# Patient Record
Sex: FEMALE | Race: OTHER | Employment: FULL TIME | URBAN - METROPOLITAN AREA
[De-identification: names, ages, dates, MRNs, and addresses within clinical notes are randomized per-mention and may not be internally consistent; named-entity substitution may affect disease eponyms.]

---

## 2018-11-08 ENCOUNTER — APPOINTMENT (OUTPATIENT)
Dept: CT IMAGING | Age: 53
DRG: 690 | End: 2018-11-08
Attending: EMERGENCY MEDICINE
Payer: SELF-PAY

## 2018-11-08 ENCOUNTER — HOSPITAL ENCOUNTER (INPATIENT)
Age: 53
LOS: 5 days | Discharge: HOME | DRG: 690 | End: 2018-11-13
Attending: EMERGENCY MEDICINE | Admitting: INTERNAL MEDICINE
Payer: SELF-PAY

## 2018-11-08 DIAGNOSIS — N10 ACUTE PYELONEPHRITIS: Primary | ICD-10-CM

## 2018-11-08 PROBLEM — R19.7 DIARRHEA: Status: ACTIVE | Noted: 2018-11-08

## 2018-11-08 PROBLEM — R13.10 ODYNOPHAGIA: Status: ACTIVE | Noted: 2018-11-08

## 2018-11-08 PROBLEM — E87.1 HYPONATREMIA WITH EXTRACELLULAR FLUID DEPLETION: Status: ACTIVE | Noted: 2018-11-08

## 2018-11-08 LAB
ALBUMIN SERPL-MCNC: 2.3 G/DL (ref 3.5–5)
ALBUMIN/GLOB SERPL: 0.4 {RATIO} (ref 1.1–2.2)
ALP SERPL-CCNC: 172 U/L (ref 45–117)
ALT SERPL-CCNC: 16 U/L (ref 12–78)
ANION GAP SERPL CALC-SCNC: 5 MMOL/L (ref 5–15)
APPEARANCE UR: CLEAR
AST SERPL-CCNC: 13 U/L (ref 15–37)
BACTERIA URNS QL MICRO: NEGATIVE /HPF
BASOPHILS # BLD: 0 K/UL (ref 0–0.1)
BASOPHILS NFR BLD: 0 % (ref 0–1)
BILIRUB SERPL-MCNC: 0.5 MG/DL (ref 0.2–1)
BILIRUB UR QL: NEGATIVE
BUN SERPL-MCNC: 9 MG/DL (ref 6–20)
BUN/CREAT SERPL: 13 (ref 12–20)
CALCIUM SERPL-MCNC: 7.9 MG/DL (ref 8.5–10.1)
CHLORIDE SERPL-SCNC: 96 MMOL/L (ref 97–108)
CO2 SERPL-SCNC: 31 MMOL/L (ref 21–32)
COLOR UR: ABNORMAL
CREAT SERPL-MCNC: 0.71 MG/DL (ref 0.55–1.02)
DEPRECATED S PYO AG THROAT QL EIA: NEGATIVE
DIFFERENTIAL METHOD BLD: ABNORMAL
EOSINOPHIL # BLD: 0 K/UL (ref 0–0.4)
EOSINOPHIL NFR BLD: 0 % (ref 0–7)
EPITH CASTS URNS QL MICRO: ABNORMAL /LPF
ERYTHROCYTE [DISTWIDTH] IN BLOOD BY AUTOMATED COUNT: 12.1 % (ref 11.5–14.5)
EST. AVERAGE GLUCOSE BLD GHB EST-MCNC: 372 MG/DL
GLOBULIN SER CALC-MCNC: 5.6 G/DL (ref 2–4)
GLUCOSE BLD STRIP.AUTO-MCNC: 287 MG/DL (ref 65–100)
GLUCOSE SERPL-MCNC: 352 MG/DL (ref 65–100)
GLUCOSE UR STRIP.AUTO-MCNC: >1000 MG/DL
HBA1C MFR BLD: 14.6 % (ref 4.2–6.3)
HCT VFR BLD AUTO: 38 % (ref 35–47)
HGB BLD-MCNC: 12.1 G/DL (ref 11.5–16)
HGB UR QL STRIP: ABNORMAL
IMM GRANULOCYTES # BLD: 0.2 K/UL (ref 0–0.04)
IMM GRANULOCYTES NFR BLD AUTO: 1 % (ref 0–0.5)
KETONES UR QL STRIP.AUTO: 40 MG/DL
LACTATE BLD-SCNC: 0.9 MMOL/L (ref 0.4–2)
LEUKOCYTE ESTERASE UR QL STRIP.AUTO: NEGATIVE
LIPASE SERPL-CCNC: 223 U/L (ref 73–393)
LYMPHOCYTES # BLD: 0.8 K/UL (ref 0.8–3.5)
LYMPHOCYTES NFR BLD: 5 % (ref 12–49)
MCH RBC QN AUTO: 27.2 PG (ref 26–34)
MCHC RBC AUTO-ENTMCNC: 31.8 G/DL (ref 30–36.5)
MCV RBC AUTO: 85.4 FL (ref 80–99)
MONOCYTES # BLD: 0.6 K/UL (ref 0–1)
MONOCYTES NFR BLD: 4 % (ref 5–13)
NEUTS SEG # BLD: 13.6 K/UL (ref 1.8–8)
NEUTS SEG NFR BLD: 90 % (ref 32–75)
NITRITE UR QL STRIP.AUTO: NEGATIVE
NRBC # BLD: 0 K/UL (ref 0–0.01)
NRBC BLD-RTO: 0 PER 100 WBC
PH UR STRIP: 6.5 [PH] (ref 5–8)
PLATELET # BLD AUTO: 442 K/UL (ref 150–400)
PMV BLD AUTO: 10.6 FL (ref 8.9–12.9)
POTASSIUM SERPL-SCNC: 3.7 MMOL/L (ref 3.5–5.1)
PROT SERPL-MCNC: 7.9 G/DL (ref 6.4–8.2)
PROT UR STRIP-MCNC: NEGATIVE MG/DL
RBC # BLD AUTO: 4.45 M/UL (ref 3.8–5.2)
RBC #/AREA URNS HPF: ABNORMAL /HPF (ref 0–5)
RBC MORPH BLD: ABNORMAL
SERVICE CMNT-IMP: ABNORMAL
SODIUM SERPL-SCNC: 132 MMOL/L (ref 136–145)
SP GR UR REFRACTOMETRY: 1.03 (ref 1–1.03)
TROPONIN I SERPL-MCNC: <0.05 NG/ML
UR CULT HOLD, URHOLD: NORMAL
UROBILINOGEN UR QL STRIP.AUTO: 1 EU/DL (ref 0.2–1)
WBC # BLD AUTO: 15.2 K/UL (ref 3.6–11)
WBC URNS QL MICRO: ABNORMAL /HPF (ref 0–4)

## 2018-11-08 PROCEDURE — 83605 ASSAY OF LACTIC ACID: CPT

## 2018-11-08 PROCEDURE — 74177 CT ABD & PELVIS W/CONTRAST: CPT

## 2018-11-08 PROCEDURE — 74011250636 HC RX REV CODE- 250/636: Performed by: INTERNAL MEDICINE

## 2018-11-08 PROCEDURE — 87147 CULTURE TYPE IMMUNOLOGIC: CPT

## 2018-11-08 PROCEDURE — 74011636320 HC RX REV CODE- 636/320: Performed by: RADIOLOGY

## 2018-11-08 PROCEDURE — 83036 HEMOGLOBIN GLYCOSYLATED A1C: CPT

## 2018-11-08 PROCEDURE — 99284 EMERGENCY DEPT VISIT MOD MDM: CPT

## 2018-11-08 PROCEDURE — 74011000258 HC RX REV CODE- 258: Performed by: INTERNAL MEDICINE

## 2018-11-08 PROCEDURE — 96375 TX/PRO/DX INJ NEW DRUG ADDON: CPT

## 2018-11-08 PROCEDURE — 87040 BLOOD CULTURE FOR BACTERIA: CPT

## 2018-11-08 PROCEDURE — 74011000250 HC RX REV CODE- 250: Performed by: PHYSICIAN ASSISTANT

## 2018-11-08 PROCEDURE — 80053 COMPREHEN METABOLIC PANEL: CPT

## 2018-11-08 PROCEDURE — 82962 GLUCOSE BLOOD TEST: CPT

## 2018-11-08 PROCEDURE — 65270000029 HC RM PRIVATE

## 2018-11-08 PROCEDURE — 74011250636 HC RX REV CODE- 250/636: Performed by: EMERGENCY MEDICINE

## 2018-11-08 PROCEDURE — 74011636637 HC RX REV CODE- 636/637: Performed by: INTERNAL MEDICINE

## 2018-11-08 PROCEDURE — 87077 CULTURE AEROBIC IDENTIFY: CPT

## 2018-11-08 PROCEDURE — 81001 URINALYSIS AUTO W/SCOPE: CPT

## 2018-11-08 PROCEDURE — 85025 COMPLETE CBC W/AUTO DIFF WBC: CPT

## 2018-11-08 PROCEDURE — 93005 ELECTROCARDIOGRAM TRACING: CPT

## 2018-11-08 PROCEDURE — 36415 COLL VENOUS BLD VENIPUNCTURE: CPT

## 2018-11-08 PROCEDURE — 84484 ASSAY OF TROPONIN QUANT: CPT

## 2018-11-08 PROCEDURE — 74011250637 HC RX REV CODE- 250/637: Performed by: INTERNAL MEDICINE

## 2018-11-08 PROCEDURE — 87086 URINE CULTURE/COLONY COUNT: CPT

## 2018-11-08 PROCEDURE — 83690 ASSAY OF LIPASE: CPT

## 2018-11-08 PROCEDURE — 87880 STREP A ASSAY W/OPTIC: CPT

## 2018-11-08 PROCEDURE — 96361 HYDRATE IV INFUSION ADD-ON: CPT

## 2018-11-08 PROCEDURE — 96374 THER/PROPH/DIAG INJ IV PUSH: CPT

## 2018-11-08 PROCEDURE — 74011250637 HC RX REV CODE- 250/637: Performed by: PHYSICIAN ASSISTANT

## 2018-11-08 PROCEDURE — 74011250636 HC RX REV CODE- 250/636: Performed by: PHYSICIAN ASSISTANT

## 2018-11-08 PROCEDURE — 87070 CULTURE OTHR SPECIMN AEROBIC: CPT

## 2018-11-08 PROCEDURE — 87186 SC STD MICRODIL/AGAR DIL: CPT

## 2018-11-08 RX ORDER — MAGNESIUM SULFATE 100 %
4 CRYSTALS MISCELLANEOUS AS NEEDED
Status: DISCONTINUED | OUTPATIENT
Start: 2018-11-08 | End: 2018-11-13 | Stop reason: HOSPADM

## 2018-11-08 RX ORDER — SODIUM CHLORIDE 9 MG/ML
100 INJECTION, SOLUTION INTRAVENOUS CONTINUOUS
Status: DISPENSED | OUTPATIENT
Start: 2018-11-08 | End: 2018-11-11

## 2018-11-08 RX ORDER — ONDANSETRON 2 MG/ML
4 INJECTION INTRAMUSCULAR; INTRAVENOUS
Status: COMPLETED | OUTPATIENT
Start: 2018-11-08 | End: 2018-11-08

## 2018-11-08 RX ORDER — NYSTATIN 100000 [USP'U]/ML
500000 SUSPENSION ORAL 4 TIMES DAILY
Status: DISCONTINUED | OUTPATIENT
Start: 2018-11-08 | End: 2018-11-09

## 2018-11-08 RX ORDER — KETOROLAC TROMETHAMINE 30 MG/ML
15 INJECTION, SOLUTION INTRAMUSCULAR; INTRAVENOUS
Status: COMPLETED | OUTPATIENT
Start: 2018-11-08 | End: 2018-11-08

## 2018-11-08 RX ORDER — SODIUM CHLORIDE 0.9 % (FLUSH) 0.9 %
5-10 SYRINGE (ML) INJECTION AS NEEDED
Status: DISCONTINUED | OUTPATIENT
Start: 2018-11-08 | End: 2018-11-13 | Stop reason: HOSPADM

## 2018-11-08 RX ORDER — ONDANSETRON 2 MG/ML
4 INJECTION INTRAMUSCULAR; INTRAVENOUS
Status: DISCONTINUED | OUTPATIENT
Start: 2018-11-08 | End: 2018-11-13 | Stop reason: HOSPADM

## 2018-11-08 RX ORDER — ENOXAPARIN SODIUM 100 MG/ML
40 INJECTION SUBCUTANEOUS EVERY 24 HOURS
Status: DISCONTINUED | OUTPATIENT
Start: 2018-11-08 | End: 2018-11-13 | Stop reason: HOSPADM

## 2018-11-08 RX ORDER — SODIUM CHLORIDE 0.9 % (FLUSH) 0.9 %
5-10 SYRINGE (ML) INJECTION EVERY 8 HOURS
Status: DISCONTINUED | OUTPATIENT
Start: 2018-11-08 | End: 2018-11-13 | Stop reason: HOSPADM

## 2018-11-08 RX ORDER — DEXTROSE 50 % IN WATER (D50W) INTRAVENOUS SYRINGE
12.5-25 AS NEEDED
Status: DISCONTINUED | OUTPATIENT
Start: 2018-11-08 | End: 2018-11-13 | Stop reason: HOSPADM

## 2018-11-08 RX ORDER — INSULIN LISPRO 100 [IU]/ML
INJECTION, SOLUTION INTRAVENOUS; SUBCUTANEOUS
Status: DISCONTINUED | OUTPATIENT
Start: 2018-11-08 | End: 2018-11-13 | Stop reason: HOSPADM

## 2018-11-08 RX ORDER — SODIUM CHLORIDE 9 MG/ML
125 INJECTION, SOLUTION INTRAVENOUS CONTINUOUS
Status: DISCONTINUED | OUTPATIENT
Start: 2018-11-08 | End: 2018-11-09

## 2018-11-08 RX ORDER — NALOXONE HYDROCHLORIDE 0.4 MG/ML
0.4 INJECTION, SOLUTION INTRAMUSCULAR; INTRAVENOUS; SUBCUTANEOUS AS NEEDED
Status: DISCONTINUED | OUTPATIENT
Start: 2018-11-08 | End: 2018-11-13 | Stop reason: HOSPADM

## 2018-11-08 RX ORDER — ACETAMINOPHEN 325 MG/1
650 TABLET ORAL
Status: DISCONTINUED | OUTPATIENT
Start: 2018-11-08 | End: 2018-11-13 | Stop reason: HOSPADM

## 2018-11-08 RX ADMIN — NYSTATIN 500000 UNITS: 500000 SUSPENSION ORAL at 22:03

## 2018-11-08 RX ADMIN — IOPAMIDOL 100 ML: 755 INJECTION, SOLUTION INTRAVENOUS at 17:28

## 2018-11-08 RX ADMIN — INSULIN LISPRO 3 UNITS: 100 INJECTION, SOLUTION INTRAVENOUS; SUBCUTANEOUS at 22:03

## 2018-11-08 RX ADMIN — Medication 10 ML: at 22:04

## 2018-11-08 RX ADMIN — ONDANSETRON 4 MG: 2 INJECTION INTRAMUSCULAR; INTRAVENOUS at 16:02

## 2018-11-08 RX ADMIN — SODIUM CHLORIDE 1000 ML: 900 INJECTION, SOLUTION INTRAVENOUS at 16:01

## 2018-11-08 RX ADMIN — SODIUM CHLORIDE 100 ML/HR: 900 INJECTION, SOLUTION INTRAVENOUS at 22:00

## 2018-11-08 RX ADMIN — CEFTRIAXONE SODIUM 1 G: 1 INJECTION, POWDER, FOR SOLUTION INTRAMUSCULAR; INTRAVENOUS at 19:04

## 2018-11-08 RX ADMIN — ENOXAPARIN SODIUM 40 MG: 40 INJECTION SUBCUTANEOUS at 22:03

## 2018-11-08 RX ADMIN — KETOROLAC TROMETHAMINE 15 MG: 30 INJECTION, SOLUTION INTRAMUSCULAR at 16:02

## 2018-11-08 RX ADMIN — LIDOCAINE HYDROCHLORIDE 40 ML: 20 SOLUTION ORAL; TOPICAL at 16:25

## 2018-11-08 RX ADMIN — PIPERACILLIN SODIUM,TAZOBACTAM SODIUM 3.38 G: 3; .375 INJECTION, POWDER, FOR SOLUTION INTRAVENOUS at 22:01

## 2018-11-08 NOTE — ED TRIAGE NOTES
TRANSFER - OUT REPORT: 
 
Verbal report given to David Moctezuma (name) on Gato Perez  being transferred to [facility] (unit) for routine progression of care Report consisted of patients Situation, Background, Assessment and  
Recommendations(SBAR). Information from the following report(s) SBAR, ED Summary, [facility] and Recent Results was reviewed with the receiving nurse. Lines:  
Peripheral IV 11/08/18 Right Antecubital (Active) Site Assessment Clean, dry, & intact 11/8/2018  4:11 PM  
Phlebitis Assessment 0 11/8/2018  4:11 PM  
Infiltration Assessment 0 11/8/2018  4:11 PM  
Dressing Status Clean, dry, & intact 11/8/2018  4:11 PM  
Dressing Type Transparent;Tape 11/8/2018  4:11 PM  
Hub Color/Line Status Pink;Flushed 11/8/2018  4:11 PM  
Action Taken Blood drawn 11/8/2018  4:11 PM  
Alcohol Cap Used Yes 11/8/2018  4:11 PM  
  
 
Opportunity for questions and clarification was provided. Patient transported with: 
 Smit Ovens

## 2018-11-08 NOTE — ED PROVIDER NOTES
3:16 PM 
I have evaluated the patient as the Provider in Triage. I have reviewed Her vital signs and the triage nurse assessment. I have talked with the patient and any available family and advised that I am the provider in triage and have ordered the appropriate study to initiate their work up based on the clinical presentation during my assessment. I have advised that the patient will be accommodated in the Main ED as soon as possible. I have also requested to contact the triage nurse or myself immediately if the patient experiences any changes in their condition during this brief waiting period. 47 yo female presents with lower abdominal pain and urinary symptoms. + fever. + sore throat. +nausea. 9/10 pain. Karen Gar. Bryanna Langston MD 
 
48 y.o. female with no significant past medical history who presents from home with chief complaint of abdominal pain. Pt has been experiencing a burning sensation radiating from her throat down to her epigastrium intermittently for the past 2 weeks that is exacerbated by PO. She also states that she has been experiencing abdominal pain and diarrhea over the past 4 days with 3-4 episodes/day. Pt notes a subjective fever and some dysuria as well. Pt's abdominal pain is currently the worst in the RLQ. She states that her symptoms today are consistent with how her symptoms have been over the past 2 weeks and she denies any worsening. Pt has a h/o diabetes but does not take any daily medications. She has a h/o  but denies any other abdominal surgeries. Pt denies any vomiting or SOB. There are no other acute medical concerns at this time. Social hx: no tobacco use; no EtOH use ; son is translating at patient's request 
 
Note written by Ron Covington, as dictated by Sharmila Bolton PA-C 4:10 PM 
 
 
The history is provided by the patient and a relative (son). A  was used (son). No past medical history on file. No past surgical history on file. Family History:  
Problem Relation Age of Onset  Diabetes Sister Social History Socioeconomic History  Marital status: SINGLE Spouse name: Not on file  Number of children: Not on file  Years of education: Not on file  Highest education level: Not on file Social Needs  Financial resource strain: Not on file  Food insecurity - worry: Not on file  Food insecurity - inability: Not on file  Transportation needs - medical: Not on file  Transportation needs - non-medical: Not on file Occupational History  Not on file Tobacco Use  Smoking status: Never Smoker Substance and Sexual Activity  Alcohol use: No  
  Alcohol/week: 0.0 oz  Drug use: No  
 Sexual activity: Not on file Other Topics Concern  Not on file Social History Narrative  Not on file ALLERGIES: Patient has no known allergies. Review of Systems Constitutional: Positive for fever (subjective). HENT: Positive for sore throat. Respiratory: Negative for shortness of breath. Cardiovascular: Positive for chest pain. Gastrointestinal: Positive for abdominal pain and diarrhea. Negative for vomiting. Genitourinary: Positive for dysuria. Musculoskeletal: Negative for myalgias. All other systems reviewed and are negative. Vitals:  
 11/08/18 1517 BP: 112/64 Pulse: 92 Resp: 18 Temp: 98.5 °F (36.9 °C) SpO2: 100% Weight: 57.2 kg (126 lb) Physical Exam  
Constitutional: She is oriented to person, place, and time. She appears well-developed and well-nourished. She is active. Non-toxic appearance. No distress.  female HENT:  
Head: Normocephalic and atraumatic. Eyes: Conjunctivae are normal. Pupils are equal, round, and reactive to light. Right eye exhibits no discharge. Left eye exhibits no discharge.   
Neck: Normal range of motion and full passive range of motion without pain. No tracheal tenderness present. Cardiovascular: Normal rate, regular rhythm, normal heart sounds, intact distal pulses and normal pulses. Exam reveals no gallop and no friction rub. No murmur heard. Pulmonary/Chest: Effort normal and breath sounds normal. No respiratory distress. She has no wheezes. She has no rales. She exhibits no tenderness. Abdominal: Soft. Bowel sounds are normal. She exhibits no distension. There is tenderness (states most tenderness is in RLQ, however has epigastric and RUQ TTP) in the right upper quadrant, right lower quadrant and epigastric area. There is no rebound and no guarding. Musculoskeletal: Normal range of motion. She exhibits no edema or tenderness. Neurological: She is alert and oriented to person, place, and time. She has normal strength. No cranial nerve deficit or sensory deficit. Coordination normal.  
Skin: Skin is warm, dry and intact. Capillary refill takes less than 2 seconds. No abrasion and no rash noted. She is not diaphoretic. No erythema. Psychiatric: She has a normal mood and affect. Her speech is normal and behavior is normal. Cognition and memory are normal.  
Nursing note and vitals reviewed. MDM Number of Diagnoses or Management Options Diagnosis management comments:  
Ddx: UTI, pyelo, electrolyte abnormality, ACS / atypical CP, cholecystitis Amount and/or Complexity of Data Reviewed Clinical lab tests: ordered and reviewed Tests in the radiology section of CPT®: reviewed and ordered Review and summarize past medical records: yes Discuss the patient with other providers: yes Patient Progress Patient progress: stable Procedures The patient was seen by the supervising physician who was the provider in triage. I discussed patient's PMH, exam findings as well as careplan with the attending who agrees with care plan. Katlyn Melendez PA-C 
 
 
EKG interpretation: Rhythm: normal sinus rhythm; and regular . Rate (approx.): 88; Axis: normal; P wave: normal; QRS interval: normal ; ST/T wave: normal; Other findings: normal. 
Interpreted by Dr. Shelby Thomas 6:10pm 
Patient with acute pyelo on CT, leukocytosis, possible early abscesses. Discussed with Dr. Shelby Thomas who recommends admission, will add poc lactate (vitals have been normal here, only with leukocytosis initially), he recommends [place] and hospitalist admission. Fernando Montgomery PA-C 
 
 
CONSULT NOTE:  
6:12 PM 
Fernando Montgomery PA-C spoke with Dr. Du Mueller, Specialty: hospitalist 
Discussed pt's hx, disposition, and available diagnostic and imaging results. Reviewed care plans. Consultant agrees with plans as outlined. He will review the chart. Written by Fernando Montgomery PA-C.

## 2018-11-09 LAB
ATRIAL RATE: 88 BPM
CALCULATED P AXIS, ECG09: 35 DEGREES
CALCULATED R AXIS, ECG10: 43 DEGREES
CALCULATED T AXIS, ECG11: 33 DEGREES
DIAGNOSIS, 93000: NORMAL
GLUCOSE BLD STRIP.AUTO-MCNC: 264 MG/DL (ref 65–100)
GLUCOSE BLD STRIP.AUTO-MCNC: 269 MG/DL (ref 65–100)
GLUCOSE BLD STRIP.AUTO-MCNC: 272 MG/DL (ref 65–100)
GLUCOSE BLD STRIP.AUTO-MCNC: 279 MG/DL (ref 65–100)
GLUCOSE BLD STRIP.AUTO-MCNC: 291 MG/DL (ref 65–100)
P-R INTERVAL, ECG05: 154 MS
Q-T INTERVAL, ECG07: 372 MS
QRS DURATION, ECG06: 82 MS
QTC CALCULATION (BEZET), ECG08: 450 MS
SERVICE CMNT-IMP: ABNORMAL
VENTRICULAR RATE, ECG03: 88 BPM

## 2018-11-09 PROCEDURE — 74011250637 HC RX REV CODE- 250/637: Performed by: INTERNAL MEDICINE

## 2018-11-09 PROCEDURE — 82962 GLUCOSE BLOOD TEST: CPT

## 2018-11-09 PROCEDURE — 74011636637 HC RX REV CODE- 636/637: Performed by: INTERNAL MEDICINE

## 2018-11-09 PROCEDURE — 74011250636 HC RX REV CODE- 250/636: Performed by: INTERNAL MEDICINE

## 2018-11-09 PROCEDURE — 90686 IIV4 VACC NO PRSV 0.5 ML IM: CPT | Performed by: INTERNAL MEDICINE

## 2018-11-09 PROCEDURE — 65270000029 HC RM PRIVATE

## 2018-11-09 PROCEDURE — 90471 IMMUNIZATION ADMIN: CPT

## 2018-11-09 PROCEDURE — 74011000258 HC RX REV CODE- 258: Performed by: INTERNAL MEDICINE

## 2018-11-09 PROCEDURE — 94760 N-INVAS EAR/PLS OXIMETRY 1: CPT

## 2018-11-09 RX ORDER — FLUCONAZOLE 100 MG/1
200 TABLET ORAL DAILY
Status: COMPLETED | OUTPATIENT
Start: 2018-11-09 | End: 2018-11-12

## 2018-11-09 RX ADMIN — Medication 10 ML: at 04:08

## 2018-11-09 RX ADMIN — ENOXAPARIN SODIUM 40 MG: 40 INJECTION SUBCUTANEOUS at 20:06

## 2018-11-09 RX ADMIN — INSULIN LISPRO 5 UNITS: 100 INJECTION, SOLUTION INTRAVENOUS; SUBCUTANEOUS at 11:57

## 2018-11-09 RX ADMIN — SODIUM CHLORIDE 100 ML/HR: 900 INJECTION, SOLUTION INTRAVENOUS at 20:50

## 2018-11-09 RX ADMIN — PIPERACILLIN SODIUM,TAZOBACTAM SODIUM 3.38 G: 3; .375 INJECTION, POWDER, FOR SOLUTION INTRAVENOUS at 04:07

## 2018-11-09 RX ADMIN — INSULIN LISPRO 3 UNITS: 100 INJECTION, SOLUTION INTRAVENOUS; SUBCUTANEOUS at 22:17

## 2018-11-09 RX ADMIN — BENZOCAINE AND MENTHOL 1 LOZENGE: 15; 3.6 LOZENGE ORAL at 11:55

## 2018-11-09 RX ADMIN — INSULIN LISPRO 5 UNITS: 100 INJECTION, SOLUTION INTRAVENOUS; SUBCUTANEOUS at 16:44

## 2018-11-09 RX ADMIN — FLUCONAZOLE 200 MG: 100 TABLET ORAL at 11:55

## 2018-11-09 RX ADMIN — Medication 10 ML: at 22:18

## 2018-11-09 RX ADMIN — PIPERACILLIN SODIUM,TAZOBACTAM SODIUM 3.38 G: 3; .375 INJECTION, POWDER, FOR SOLUTION INTRAVENOUS at 12:01

## 2018-11-09 RX ADMIN — ACETAMINOPHEN 650 MG: 325 TABLET, FILM COATED ORAL at 08:02

## 2018-11-09 RX ADMIN — BENZOCAINE AND MENTHOL 1 LOZENGE: 15; 3.6 LOZENGE ORAL at 03:57

## 2018-11-09 RX ADMIN — INFLUENZA VIRUS VACCINE 0.5 ML: 15; 15; 15; 15 SUSPENSION INTRAMUSCULAR at 08:18

## 2018-11-09 RX ADMIN — BENZOCAINE AND MENTHOL 1 LOZENGE: 15; 3.6 LOZENGE ORAL at 08:49

## 2018-11-09 RX ADMIN — PIPERACILLIN SODIUM,TAZOBACTAM SODIUM 3.38 G: 3; .375 INJECTION, POWDER, FOR SOLUTION INTRAVENOUS at 20:06

## 2018-11-09 RX ADMIN — NYSTATIN 500000 UNITS: 500000 SUSPENSION ORAL at 09:33

## 2018-11-09 RX ADMIN — ACETAMINOPHEN 650 MG: 325 TABLET, FILM COATED ORAL at 03:58

## 2018-11-09 RX ADMIN — INSULIN LISPRO 5 UNITS: 100 INJECTION, SOLUTION INTRAVENOUS; SUBCUTANEOUS at 08:02

## 2018-11-09 NOTE — PROGRESS NOTES
Pt complaining of 10/10 pain in her throat. MD notified and order for Cepacol PRN placed. Pt viably wincing when trying to swallow water.

## 2018-11-09 NOTE — CDMP QUERY
There is documentation of Diabetes type 2 for this patient; can this be further clarified to reflect Type 2 diabetes mellitus with hyperglycemia in the setting of POC glucose 287/279/269, Hgb A1c 14.6 Est. average glucose 372 requiring glucose monitoring and correction scale insulin coverage. 
 
=> Other explanation of clinical findings  
=> Clinically Undetermined (no explanation for clinical findings) The medical record reflects the following clinical findings, treatment, and risk factors. Risk Factors:  Diabetes type 2 Clinical Indicators:   
Hgb A1c 14.6 Est. average glucose 372 POC glucose 287/279/269 Treatment: POC glucose monitoring, Correction scale insulin Please clarify and document your clinical opinion in the progress notes and discharge summary including the definitive and/or presumptive diagnosis, (suspected or probable), related to the above clinical findings. Please include clinical findings supporting your diagnosis. Thank you Erinn Crum 
[facility] 
[phone]

## 2018-11-09 NOTE — PROGRESS NOTES
TRANSFER - IN REPORT: 
 
Verbal report received from Rosalinda(name) on [address]  being received from ED(unit) for routine progression of care Report consisted of patients Situation, Background, Assessment and  
Recommendations(SBAR). Information from the following report(s) SBAR, Kardex, ED Summary, Intake/Output, MAR, Accordion and Recent Results was reviewed with the receiving nurse. Opportunity for questions and clarification was provided. Assessment completed upon patients arrival to unit and care assumed. Primary Nurse Micki Dsouza and MIGUEL ANGEL Guerrero performed a dual skin assessment on this patient No impairment noted Jung score is 23

## 2018-11-09 NOTE — H&P
[address] [place] [address] 
[phone] Admission History and Physical 
 
 
NAME:              Regulo Vance :   1965 MRN:  [id number] PCP:  None Date:     2018 Chief  Complaint: Abdominal pain, dysphagia, diarrhea History Of Presenting Illness: Ms. Ginna Maria is a 48 y.o. female who is being admitted for acute pyelonephritis. Ms. Ginna Maria only speaks Libyan and I have discussed with her via a [employer]. She presented to our Emergency Department today complaining of a worsening generalized but mostly right flank abdominal pain, associated with diarrhea. She has also seen having difficulty swallowing with subjective fever and chills. No cough or SOB. No nausea or vomiting. Her diarrhea had no blood. A CT scan done in the ED showed likely acute pyelonephritis. She will be admitted for further management. No Known Allergies Prior to Admission medications Medication Sig Start Date End Date Taking? Authorizing Provider  
metFORMIN ER (GLUCOPHAGE XR) 500 mg tablet 3 with supper for 1 week and then if feels well, 4 with supper. Libyan instructions. 16   Gamaliel Sim MD  
naproxen (NAPROSYN) 375 mg tablet Take 1 Tab by mouth two (2) times daily (with meals). [place]yan instructions. Only for severe pain. 16   Gamaliel Sim MD  
amitriptyline (ELAVIL) 10 mg tablet Take 1 Tab by mouth nightly. For pain and sleep. Libyan instructions. 16   Kayleen Sands NP Past Medical History:  
Diagnosis Date  DM (diabetes mellitus) ([facility] [address] No past surgical history on file. Social History Tobacco Use  Smoking status: Never Smoker Substance Use Topics  Alcohol use: No  
  Alcohol/week: 0.0 oz Family History Problem Relation Age of Onset  Diabetes Sister Review of Systems: 
Constitutional ROS: + fever, + chills, + rigors but no night sweats Respiratory ROS: no cough, sputum, hemoptysis, dyspnea or pleuritic pain. Cardiovascular ROS: no chest pain, palpitations, orthopnea, PND or syncope Endocrine ROS: no polydispsia, polyuria, heat or cold intolerance or major weight change. Gastrointestinal ROS: + dysphagia, abdominal pain but no nausea, vomiting Genito-Urinary ROS: no dysuria, frequency, hematuria, retention or flank pain Musculoskeletal ROS: no joint pain, swelling or muscular tenderness Neurological ROS: no headache, confusion, focal weakness or any other neurological symptoms Psychiatric ROS: no depression, anxiety, mood swings Dermatological ROS: no rash, pruritis, or urticaria Heme-Lymph ROS: no swollen glands, bleeding Examination: 
 
Constitutional:   
Visit Vitals BP 99/67 Pulse 92 Temp 98.5 °F (36.9 °C) Resp 18 Wt 57.2 kg (126 lb) SpO2 96% BMI 25.61 kg/m² General:  Weak and ill looking patient in no acute distress Eyes: Pink conjunctivae, PERRLA with no discharge. Normal eye movements Ear, Nose, Mouth & Throat: No ottorrhea, rhinorrhea, non tender sinuses, dry mucous membranes. Likely oral thrush Respiratory:  No accessory muscle use, clear breath sounds without crackles or wheezes Cardiovascular:  No JVD or murmurs, regular and normal S1, S2 without thrills, bruits or peripheral edema. Capillary refil+, good distal pulses GI & :  Soft abdomen, mild abdominal tenderness but not distended and no palpable organomegaly Heme:  No cervical or axillary adenopathy. Musculoskeletal:  No cyanosis, clubbing, atrophy or deformities Skin:  No rashes, bruising or ulcers Neurological: Awake and alert, speech is clear, CNs 2-12 are grossly intact and otherwise non focal 
Psychiatric:  Has a fair insight and is oriented x 3 
________________________________________________________________________ Data Review: 
 
Labs: Recent Labs 11/08/18 
1534 WBC 15.2* HGB 12.1 HCT 38.0  
* Recent Labs 11/08/18 
1534 * K 3.7 CL 96* CO2 31 * BUN 9  
CREA 0.71  
CA 7.9* ALB 2.3*  
SGOT 13* ALT 16 No components found for: [place] No results for input(s): PH, PCO2, PO2, HCO3, FIO2 in the last 72 hours. No results for input(s): INR in the last 72 hours. No lab exists for component: INREXT Radiological Studies:   
 
CT scan abdomen - Extensive parenchymal abnormality throughout the right kidney most suggestive of severe acute pyelonephritis/focal nephritis, with possible developing parenchymal abscess in scattered areas. There are nonobstructing intrarenal calculi but no hydroureteronephrosis. Assessment & Impression: Ms. Lionel Moritz is a 48 y.o. female being evaluated for:  
 
Principal Problem: 
  Acute pyelonephritis (11/8/2018) Active Problems: 
  DM (diabetes mellitus), type 2 ([address] (1/12/2016) Diarrhea (11/8/2018) Odynophagia (11/8/2018) Hyponatremia with extracellular fluid depletion (11/8/2018) Plan of management: 
 
Acute pyelonephritis (11/8/2018): involving right kidney. Concern for an abscess formation. Admit to hospital. IVFs. Obtain urine and blood cultures. Empiric IV Zosyn. Urology consult if no improvement DM (diabetes mellitus), type 2 ([address] (1/12/2016): unclear if she is taking any medications. Monitor blood glucose. SSI per protocol for now. May need long acting insulin. Check A1c Hyponatremia with extracellular fluid depletion (11/8/2018): hydrate with IV saline and follow Na Diarrhea (11/8/2018): currently she has none. IVFs. Stool studies if persistent Odynophagia (11/8/2018): suspect thrush. Rapid strep. Nystatin and follow Code Status:  Full Surrogate decision maker: Family Risk of deterioration: high Total time spent for the care of the patient: 79 Minutes Care Plan discussed with: Patient, Nursing Staff and ED physician Discussed:  Code Status, Care Plan and D/C Planning Prophylaxis:  Lovenox Probable Disposition:  Home w/Family 
        
___________________________________________________ Attending Physician: Teodora Izquierdo MD

## 2018-11-09 NOTE — PROGRESS NOTES
11/8/2018 
7:16 PM 
Case management note Met with patient and family, they have son who did some translation. They live in 2 story home with 4 steps to enter, her bedroom is on 1st floor. Patient performs ADL's independently. [facility] on [address] for [facility] needs No PCP, gave information on [facility] for follow up  is rama Donnelly  limited Verlin Frizzle Reason for Admission:   Acute pyelonephritis RRAT Score:          6 Plan for utilizing home health:      Not for this admission Likelihood of Readmission:  Low/green Transition of Care Plan:        Home with family assistance and information provided on [facility] Management Interventions PCP Verified by CM: No(none) Mode of Transport at Discharge: Self Transition of Care Consult (CM Consult): Discharge Planning Current Support Network: Lives with Spouse Plan discussed with Pt/Family/Caregiver: Yes Discharge Location Discharge Placement: Home with family assistance Jay Carlos, Jesus Manuel N Steven Jaimes

## 2018-11-09 NOTE — DIABETES MGMT
DTC Consult Note Recommendations/ Comments: If appropriate, please consider starting pt on NPH 7 units bid. **Please have pt self-inject all future insulin doses during admission** 
 
Pt does not have insurance and discussed with pt purchasing DM supplies at [address] [place] [address] for cost savings. Pt will require the following prescriptions at discharge. The medications at a low-cost are unique to [facility] :  
 
1. Reli-On Novolin N (NPH) 2. Reli-On Insulin Syringe 1/3cc 3. Reli-On Prime Blood Glucose meter 4. Reli-On Prime Blood Glucose test strips #50 5. Reli-On Lancing device 6. ReliOn 33G Micro Thin Lancets 100ct Addendum @ 35 67 15: Met with pt and family member, who served as , to review SMBG. Pt able to return demonstration of SMBG with assistance. Encouraged pt to purchase Reli-On brand meter and supplies when supplies from meter kit provided run out, pt verbalized understanding. Pt BG at time of visit 289mg/dL. Current hospital DM medication:  
-Humalog normal sensitivity correction Consult received for:  [x]             Assessment of home management 
              []      Medication Recommendations []             Meter/monitoring 
   [x]             Insulin instruction []             New diagnosis []             Outpatient education []             Insulin pump patient []             Insulin infusion 
   []             DKA/HHS Chart reviewed and initial evaluation complete on [address] Patient is a 48 y.o. female with known history of DM on no meds at home. Used [employer]  services for consult. Pt reported that she has had DM for over 2 years and has never taken any meds or checked her BG. Reviewed A1c value with pt. Discussed insulin usage/storage and rotating injection sites. Pt reported she would feel most comfortable giving injections in her abdomen.  Pt able to return demonstration of insulin injection using vial and syringe on demo skin with minimal assistance. Reviewed hypo signs/symtpoms and treatment, pt verbalized understanding. Reviewed meal planning and importance of eliminating sugar sweetened beverages. Discussed plate method and putting meals together. Assessed and instructed patient on the following:  
·  interpretation of lab results, blood sugar goals, complications of diabetes mellitus, hypoglycemia prevention and treatment, exercise, illness management, SMBG skills, nutrition, referred to Diabetes Educator and site rotation Encouraged the following:  
· increased exercise · dietary modifications: eliminating juice, well balanced meals, use plate method to put meals together · regular blood sugar monitoring: at least 2x/day Provided patient with the following: [x]             Survival skills education materials [x]             Insulin education materials []             CHO counting education materials [x]             Outpatient DTC contact number 
             []             Glucometer Patient was able to give return demonstration of 
  [x]       Glucometer [x]       saline injection  
   with []     without [x]       assistance needed. [x]       Nurse to have patient self inject prior to discharge. Discussed with patient and/or family need for follow up appointment for diabetes management after discharge - pt declined outpatient education due to transportation issues. A1c:  
Lab Results Component Value Date/Time Hemoglobin A1c 14.6 (H) 11/08/2018 03:34 PM  
 
 
Recent Glucose Results:  
Lab Results Component Value Date/Time  (H) 11/08/2018 03:34 PM  
 GLUCPOC 291 (H) 11/09/2018 11:34 AM  
 GLUCPOC 269 (H) 11/09/2018 07:25 AM  
 GLUCPOC 279 (H) 11/09/2018 07:07 AM  
  
 
Lab Results Component Value Date/Time  Creatinine 0.71 11/08/2018 03:34 PM  
 
 CrCl cannot be calculated (Unknown ideal weight. ). Active Orders Diet DIET DIABETIC CONSISTENT CARB Regular PO intake:  
Patient Vitals for the past 72 hrs: 
 % Diet Eaten 11/09/18 0829 25 % Will continue to follow as needed. Thank you. Marylu Guerrier RD, CDE [facility] Office: [phone] Pager: [phone]

## 2018-11-09 NOTE — PROGRESS NOTES
Brandon Lugo [facility] 
[address], [place], [address] [facility] 
[phone] Medical Progress Note NAME:         Regulo Vance :        1965 MRM:        [id number] Date:          2018 Subjective: Patient has been seen and examined as a follow up for acute pyelonephritis. Chart, labs, diagnostics reviewed. She still has dysphagia. Abdominal discomfort better today. No fever or chills. I have discussed with her through the [employer] Phone  Objective: 
 
Vital Signs: 
 
Visit Vitals BP 95/61 (BP 1 Location: Right arm, BP Patient Position: At rest) Pulse 79 Temp 98 °F (36.7 °C) Resp 20 Wt 57.2 kg (126 lb) SpO2 94% Breastfeeding? No  
BMI 25.61 kg/m² Intake/Output Summary (Last 24 hours) at 2018 1022 Last data filed at 2018 8296 Gross per 24 hour Intake 480 ml Output 750 ml Net -270 ml Physical Examination: 
General:   Weak looking although not in acute distress Eyes:   pink conjunctivae, PERRLA with no discharge. ENT:   no ottorrhea or rhinorrhea with moist mucous membranes Neck: no masses, thyroid non-tender and trachea central. 
Pulm:  no accessory muscle use, clear breath sounds without crackles or wheezes Card:  no JVD or murmurs, has regular and normal S1, S2 without thrills, bruits or peripheral edema Abd:  Soft, non-tender, non-distended, normoactive bowel sounds with no palpable organomegaly Musc:  No cyanosis, clubbing, atrophy or deformities. Skin:  No rashes, bruising or ulcers. Neuro: Awake and alert. Generally a non focal exam.  
Psych:  Has a good insight and is oriented x 3 Current Facility-Administered Medications Medication Dose Route Frequency  benzocaine-menthol (CEPACOL) lozenge 1 Lozenge  1 Lozenge Mucous Membrane Q6H PRN  
 fluconazole (DIFLUCAN) tablet 200 mg  200 mg Oral DAILY  sodium chloride (NS) flush 5-10 mL  5-10 mL IntraVENous Q8H  
 sodium chloride (NS) flush 5-10 mL  5-10 mL IntraVENous PRN  
 acetaminophen (TYLENOL) tablet 650 mg  650 mg Oral Q4H PRN  
 naloxone (NARCAN) injection 0.4 mg  0.4 mg IntraVENous PRN  
 enoxaparin (LOVENOX) injection 40 mg  40 mg SubCUTAneous Q24H  
 0.9% sodium chloride infusion  100 mL/hr IntraVENous CONTINUOUS  
 ondansetron (ZOFRAN) injection 4 mg  4 mg IntraVENous Q4H PRN  
 insulin lispro (HUMALOG) injection   SubCUTAneous QID WITH MEALS  glucose chewable tablet 16 g  4 Tab Oral PRN  
 dextrose (D50W) injection syrg 12.5-25 g  12.5-25 g IntraVENous PRN  
 glucagon (GLUCAGEN) injection 1 mg  1 mg IntraMUSCular PRN  piperacillin-tazobactam (ZOSYN) 3.375 g in 0.9% sodium chloride (MBP/ADV) 100 mL  3.375 g IntraVENous Q8H Laboratory data and review: 
 
Recent Labs 11/08/18 
1534 WBC 15.2* HGB 12.1 HCT 38.0  
* Recent Labs 11/08/18 
1534 * K 3.7 CL 96* CO2 31 * BUN 9  
CREA 0.71  
CA 7.9* ALB 2.3*  
SGOT 13* ALT 16 No components found for: [place] Assessment and Plan: 
 
Acute pyelonephritis (11/8/2018): involving right kidney. Concern for an abscess formation. Blood Cx flagged positive. Continue IVFs. Empiric IV Zosyn. Monitor  
  
DM (diabetes mellitus), type 2 ([facility] (1/12/2016): uncontrolled with hyperglycemia. A1c 14.6. Start lantus. SSI per protocol. DM education.  
  
Hyponatremia with extracellular fluid depletion (11/8/2018): due to poor intake and GI losses. Hydrate with IV saline and follow Na 
  
Diarrhea (11/8/2018): resolved. IVFs. Stool studies if persistent 
  
Odynophagia (11/8/2018): suspect thrush. Rapid strep pending. Start diflucan Total time spent for the patient's care: 35 Minutes Care Plan discussed with: Patient and Nursing Staff Discussed:  Care Plan and D/C Planning Prophylaxis:  Lovenox Anticipated Disposition:  Home w/Family 
        
___________________________________________________ Attending Physician:   Miriam Muñoz MD

## 2018-11-09 NOTE — PROGRESS NOTES
Problem: Falls - Risk of 
Goal: *Absence of Falls Document Joan Deal Fall Risk and appropriate interventions in the flowsheet. Outcome: Progressing Towards Goal 
Fall Risk Interventions: 
  
 
  
 
Medication Interventions: Patient to call before getting OOB, Teach patient to arise slowly

## 2018-11-09 NOTE — PROGRESS NOTES
[facility]: MED RECONCILIATION Information obtained from: Patient and her son Allergies: Patient has no known allergies. Comment: 
Patient states she is not on any medications or dietary supplements. [address] [place], [place]   Contact: [phone]

## 2018-11-10 LAB
ANION GAP SERPL CALC-SCNC: 4 MMOL/L (ref 5–15)
BACTERIA SPEC CULT: ABNORMAL
BASOPHILS # BLD: 0 K/UL (ref 0–0.1)
BASOPHILS NFR BLD: 0 % (ref 0–1)
BUN SERPL-MCNC: 5 MG/DL (ref 6–20)
BUN/CREAT SERPL: 7 (ref 12–20)
CALCIUM SERPL-MCNC: 7.4 MG/DL (ref 8.5–10.1)
CC UR VC: ABNORMAL
CHLORIDE SERPL-SCNC: 103 MMOL/L (ref 97–108)
CO2 SERPL-SCNC: 31 MMOL/L (ref 21–32)
CREAT SERPL-MCNC: 0.68 MG/DL (ref 0.55–1.02)
DIFFERENTIAL METHOD BLD: ABNORMAL
EOSINOPHIL # BLD: 0.1 K/UL (ref 0–0.4)
EOSINOPHIL NFR BLD: 1 % (ref 0–7)
ERYTHROCYTE [DISTWIDTH] IN BLOOD BY AUTOMATED COUNT: 12.4 % (ref 11.5–14.5)
GLUCOSE BLD STRIP.AUTO-MCNC: 207 MG/DL (ref 65–100)
GLUCOSE BLD STRIP.AUTO-MCNC: 210 MG/DL (ref 65–100)
GLUCOSE BLD STRIP.AUTO-MCNC: 224 MG/DL (ref 65–100)
GLUCOSE BLD STRIP.AUTO-MCNC: 241 MG/DL (ref 65–100)
GLUCOSE SERPL-MCNC: 230 MG/DL (ref 65–100)
HCT VFR BLD AUTO: 33.4 % (ref 35–47)
HGB BLD-MCNC: 10.5 G/DL (ref 11.5–16)
IMM GRANULOCYTES # BLD: 0.1 K/UL (ref 0–0.04)
IMM GRANULOCYTES NFR BLD AUTO: 1 % (ref 0–0.5)
LYMPHOCYTES # BLD: 1.1 K/UL (ref 0.8–3.5)
LYMPHOCYTES NFR BLD: 11 % (ref 12–49)
MCH RBC QN AUTO: 27.6 PG (ref 26–34)
MCHC RBC AUTO-ENTMCNC: 31.4 G/DL (ref 30–36.5)
MCV RBC AUTO: 87.7 FL (ref 80–99)
MONOCYTES # BLD: 0.6 K/UL (ref 0–1)
MONOCYTES NFR BLD: 6 % (ref 5–13)
NEUTS SEG # BLD: 8.3 K/UL (ref 1.8–8)
NEUTS SEG NFR BLD: 82 % (ref 32–75)
NRBC # BLD: 0 K/UL (ref 0–0.01)
NRBC BLD-RTO: 0 PER 100 WBC
PLATELET # BLD AUTO: 481 K/UL (ref 150–400)
PMV BLD AUTO: 10.1 FL (ref 8.9–12.9)
POTASSIUM SERPL-SCNC: 3.9 MMOL/L (ref 3.5–5.1)
RBC # BLD AUTO: 3.81 M/UL (ref 3.8–5.2)
SERVICE CMNT-IMP: ABNORMAL
SODIUM SERPL-SCNC: 138 MMOL/L (ref 136–145)
WBC # BLD AUTO: 10.1 K/UL (ref 3.6–11)

## 2018-11-10 PROCEDURE — 85025 COMPLETE CBC W/AUTO DIFF WBC: CPT

## 2018-11-10 PROCEDURE — 65270000029 HC RM PRIVATE

## 2018-11-10 PROCEDURE — 74011636637 HC RX REV CODE- 636/637: Performed by: INTERNAL MEDICINE

## 2018-11-10 PROCEDURE — 74011250637 HC RX REV CODE- 250/637: Performed by: INTERNAL MEDICINE

## 2018-11-10 PROCEDURE — 74011000258 HC RX REV CODE- 258: Performed by: INTERNAL MEDICINE

## 2018-11-10 PROCEDURE — 80048 BASIC METABOLIC PNL TOTAL CA: CPT

## 2018-11-10 PROCEDURE — 82962 GLUCOSE BLOOD TEST: CPT

## 2018-11-10 PROCEDURE — 36415 COLL VENOUS BLD VENIPUNCTURE: CPT

## 2018-11-10 PROCEDURE — 94760 N-INVAS EAR/PLS OXIMETRY 1: CPT

## 2018-11-10 PROCEDURE — 74011250636 HC RX REV CODE- 250/636: Performed by: INTERNAL MEDICINE

## 2018-11-10 RX ORDER — INSULIN GLARGINE 100 [IU]/ML
15 INJECTION, SOLUTION SUBCUTANEOUS DAILY
Status: DISCONTINUED | OUTPATIENT
Start: 2018-11-10 | End: 2018-11-11

## 2018-11-10 RX ADMIN — PIPERACILLIN SODIUM,TAZOBACTAM SODIUM 3.38 G: 3; .375 INJECTION, POWDER, FOR SOLUTION INTRAVENOUS at 04:04

## 2018-11-10 RX ADMIN — INSULIN LISPRO 3 UNITS: 100 INJECTION, SOLUTION INTRAVENOUS; SUBCUTANEOUS at 07:58

## 2018-11-10 RX ADMIN — INSULIN GLARGINE 15 UNITS: 100 INJECTION, SOLUTION SUBCUTANEOUS at 07:59

## 2018-11-10 RX ADMIN — Medication 10 ML: at 06:00

## 2018-11-10 RX ADMIN — ENOXAPARIN SODIUM 40 MG: 40 INJECTION SUBCUTANEOUS at 20:00

## 2018-11-10 RX ADMIN — PIPERACILLIN SODIUM,TAZOBACTAM SODIUM 3.38 G: 3; .375 INJECTION, POWDER, FOR SOLUTION INTRAVENOUS at 11:48

## 2018-11-10 RX ADMIN — INSULIN LISPRO 2 UNITS: 100 INJECTION, SOLUTION INTRAVENOUS; SUBCUTANEOUS at 22:20

## 2018-11-10 RX ADMIN — SODIUM CHLORIDE 100 ML/HR: 900 INJECTION, SOLUTION INTRAVENOUS at 19:49

## 2018-11-10 RX ADMIN — FLUCONAZOLE 200 MG: 100 TABLET ORAL at 08:01

## 2018-11-10 RX ADMIN — PIPERACILLIN SODIUM,TAZOBACTAM SODIUM 3.38 G: 3; .375 INJECTION, POWDER, FOR SOLUTION INTRAVENOUS at 19:50

## 2018-11-10 RX ADMIN — Medication 10 ML: at 14:00

## 2018-11-10 RX ADMIN — INSULIN LISPRO 3 UNITS: 100 INJECTION, SOLUTION INTRAVENOUS; SUBCUTANEOUS at 16:45

## 2018-11-10 RX ADMIN — INSULIN LISPRO 3 UNITS: 100 INJECTION, SOLUTION INTRAVENOUS; SUBCUTANEOUS at 11:49

## 2018-11-10 RX ADMIN — Medication 10 ML: at 22:21

## 2018-11-10 RX ADMIN — BENZOCAINE AND MENTHOL 1 LOZENGE: 15; 3.6 LOZENGE ORAL at 07:57

## 2018-11-10 NOTE — PROGRESS NOTES
Bedside and Verbal shift change report given to MIGUEL ANGEL Jaime  by Esau Coreas RN. Report included the following information SBAR, Kardex and MAR.

## 2018-11-10 NOTE — PROGRESS NOTES
Bedside and Verbal shift change report given to Afshin RN and MIGUEL ANGEL English (oncoming nurse) by Brianna Clark RN (offgoing nurse). Report included the following information SBAR, Kardex, Intake/Output, MAR, Accordion, Recent Results, Med Rec Status and Cardiac Rhythm NSR.

## 2018-11-10 NOTE — PROGRESS NOTES
Brandon Lugo lashay Tiff [address] 
[address] [facility], [address] [place] [address] 
[phone] Medical Progress Note NAME:         Carla Dale :        1965 MRM:        [id number] Date:          11/10/2018 Subjective: Patient has been seen and examined as a follow up for acute pyelonephritis. Chart, labs, diagnostics reviewed. She with dysphagia but much improved. Abdominal discomfort better today. No fever or chills. I have discussed with her through the [employer] phone  Objective: 
 
Vital Signs: 
 
Visit Vitals /90 (BP 1 Location: Left arm, BP Patient Position: At rest) Pulse 76 Temp 98.9 °F (37.2 °C) Resp 15 Wt 57.2 kg (126 lb) SpO2 99% Breastfeeding? No  
BMI 25.61 kg/m² Intake/Output Summary (Last 24 hours) at 11/10/2018 0747 Last data filed at 11/10/2018 4801 Gross per 24 hour Intake 1691.67 ml Output 250 ml Net 1441.67 ml Physical Examination: 
General:   Weak looking although not in acute distress Eyes:   pink conjunctivae, PERRLA with no discharge. ENT:   no ottorrhea or rhinorrhea. Resolving oral thrush Pulm:  clear breath sounds without crackles or wheezes Card: has regular and normal S1, S2 without thrills, bruits or peripheral edema Abd:  Soft, non-tender, non-distended, normoactive bowel sounds Musc:  No cyanosis, clubbing, atrophy or deformities. Skin:  No rashes, bruising or ulcers. Neuro: Awake and alert. Generally a non focal exam.  
Psych:  Has a good insight and is oriented x 3 Current Facility-Administered Medications Medication Dose Route Frequency  insulin glargine (LANTUS) injection 15 Units  15 Units SubCUTAneous DAILY  benzocaine-menthol (CEPACOL) lozenge 1 Lozenge  1 Lozenge Mucous Membrane Q6H PRN  
 fluconazole (DIFLUCAN) tablet 200 mg  200 mg Oral DAILY  sodium chloride (NS) flush 5-10 mL  5-10 mL IntraVENous Q8H  
 sodium chloride (NS) flush 5-10 mL  5-10 mL IntraVENous PRN  
 acetaminophen (TYLENOL) tablet 650 mg  650 mg Oral Q4H PRN  
 naloxone (NARCAN) injection 0.4 mg  0.4 mg IntraVENous PRN  
 enoxaparin (LOVENOX) injection 40 mg  40 mg SubCUTAneous Q24H  
 0.9% sodium chloride infusion  100 mL/hr IntraVENous CONTINUOUS  
 ondansetron (ZOFRAN) injection 4 mg  4 mg IntraVENous Q4H PRN  
 insulin lispro (HUMALOG) injection   SubCUTAneous QID WITH MEALS  glucose chewable tablet 16 g  4 Tab Oral PRN  
 dextrose (D50W) injection syrg 12.5-25 g  12.5-25 g IntraVENous PRN  
 glucagon (GLUCAGEN) injection 1 mg  1 mg IntraMUSCular PRN  piperacillin-tazobactam (ZOSYN) 3.375 g in 0.9% sodium chloride (MBP/ADV) 100 mL  3.375 g IntraVENous Q8H Laboratory data and review: 
 
Recent Labs 11/10/18 
0403 11/08/18 
1534 WBC 10.1 15.2* HGB 10.5* 12.1 HCT 33.4* 38.0  
* 442* Recent Labs 11/10/18 
0403 11/08/18 
1534  132* K 3.9 3.7  96* CO2 31 31 * 352* BUN 5* 9  
CREA 0.68 0.71  
CA 7.4* 7.9* ALB  --  2.3*  
SGOT  --  13* ALT  --  16 No components found for: [place] Assessment and Plan: 
 
Acute pyelonephritis (11/8/2018): involving right kidney. Concern for an abscess formation. Clinically better. Blood Cx flagged positive. Continue IVFs. IV Zosyn. She may need a prolonged antibiotic course. Ambulate as tolerated   
  
[facility] (1/12/2016): uncontrolled with hyperglycemia. A1c 14.6. BG remains elevated. Continue lantus. SSI per protocol. DM education.  
  
Hyponatremia with extracellular fluid depletion (11/8/2018): due to poor intake and GI losses. This is now resolved. Continue IVFs for today.  
  
Diarrhea (11/8/2018): resolved. IVFs. Stool studies if persistent 
  
Odynophagia (11/8/2018): due to oral thrush. Rapid strep neg. Continue  diflucan Total time spent for the patient's care: 35 Minutes Care Plan discussed with: Patient and Nursing Staff Discussed:  Care Plan and D/C Planning Prophylaxis:  Lovenox Anticipated Disposition:  Home w/Family 
        
___________________________________________________ Attending Physician:   Sandra Araujo MD

## 2018-11-11 LAB
GLUCOSE BLD STRIP.AUTO-MCNC: 185 MG/DL (ref 65–100)
GLUCOSE BLD STRIP.AUTO-MCNC: 189 MG/DL (ref 65–100)
GLUCOSE BLD STRIP.AUTO-MCNC: 191 MG/DL (ref 65–100)
GLUCOSE BLD STRIP.AUTO-MCNC: 209 MG/DL (ref 65–100)
SERVICE CMNT-IMP: ABNORMAL

## 2018-11-11 PROCEDURE — 74011250636 HC RX REV CODE- 250/636: Performed by: INTERNAL MEDICINE

## 2018-11-11 PROCEDURE — 74011636637 HC RX REV CODE- 636/637: Performed by: INTERNAL MEDICINE

## 2018-11-11 PROCEDURE — 82962 GLUCOSE BLOOD TEST: CPT

## 2018-11-11 PROCEDURE — 74011000258 HC RX REV CODE- 258: Performed by: INTERNAL MEDICINE

## 2018-11-11 PROCEDURE — 74011250637 HC RX REV CODE- 250/637: Performed by: INTERNAL MEDICINE

## 2018-11-11 PROCEDURE — 65270000029 HC RM PRIVATE

## 2018-11-11 PROCEDURE — 94760 N-INVAS EAR/PLS OXIMETRY 1: CPT

## 2018-11-11 RX ORDER — INSULIN GLARGINE 100 [IU]/ML
25 INJECTION, SOLUTION SUBCUTANEOUS DAILY
Status: DISCONTINUED | OUTPATIENT
Start: 2018-11-11 | End: 2018-11-12

## 2018-11-11 RX ADMIN — INSULIN GLARGINE 25 UNITS: 100 INJECTION, SOLUTION SUBCUTANEOUS at 08:13

## 2018-11-11 RX ADMIN — PIPERACILLIN SODIUM,TAZOBACTAM SODIUM 3.38 G: 3; .375 INJECTION, POWDER, FOR SOLUTION INTRAVENOUS at 21:03

## 2018-11-11 RX ADMIN — INSULIN LISPRO 2 UNITS: 100 INJECTION, SOLUTION INTRAVENOUS; SUBCUTANEOUS at 11:47

## 2018-11-11 RX ADMIN — Medication 10 ML: at 21:04

## 2018-11-11 RX ADMIN — INSULIN LISPRO 2 UNITS: 100 INJECTION, SOLUTION INTRAVENOUS; SUBCUTANEOUS at 16:55

## 2018-11-11 RX ADMIN — PIPERACILLIN SODIUM,TAZOBACTAM SODIUM 3.38 G: 3; .375 INJECTION, POWDER, FOR SOLUTION INTRAVENOUS at 03:38

## 2018-11-11 RX ADMIN — Medication 5 ML: at 06:00

## 2018-11-11 RX ADMIN — INSULIN LISPRO 3 UNITS: 100 INJECTION, SOLUTION INTRAVENOUS; SUBCUTANEOUS at 08:13

## 2018-11-11 RX ADMIN — PIPERACILLIN SODIUM,TAZOBACTAM SODIUM 3.38 G: 3; .375 INJECTION, POWDER, FOR SOLUTION INTRAVENOUS at 11:47

## 2018-11-11 RX ADMIN — ENOXAPARIN SODIUM 40 MG: 40 INJECTION SUBCUTANEOUS at 21:06

## 2018-11-11 RX ADMIN — Medication 10 ML: at 14:00

## 2018-11-11 RX ADMIN — SODIUM CHLORIDE 100 ML/HR: 900 INJECTION, SOLUTION INTRAVENOUS at 21:03

## 2018-11-11 RX ADMIN — FLUCONAZOLE 200 MG: 100 TABLET ORAL at 08:13

## 2018-11-11 NOTE — PROGRESS NOTES
Brandon Lugo lashay Jermyn [address] 
[address] [facility], [address] [place] [address] 
[phone] Medical Progress Note NAME:         Alban Agudelo :        1965 MRM:        [id number] Date:          2018 Subjective: Patient has been seen and examined as a follow up for acute pyelonephritis. Chart, labs, diagnostics reviewed. She says she feels better today with no dysphagia, nausea or vomiting. No fever or chills. I have discussed with her through the [employer] [employer] phone  Objective: 
 
Vital Signs: 
 
Visit Vitals BP 91/53 (BP 1 Location: Left arm, BP Patient Position: At rest) Pulse 73 Temp 98.6 °F (37 °C) Resp 16 Wt 57.2 kg (126 lb) SpO2 98% Breastfeeding? No  
BMI 25.61 kg/m² Intake/Output Summary (Last 24 hours) at 2018 0941 Last data filed at 2018 5141 Gross per 24 hour Intake 0 ml Output 600 ml Net -600 ml Physical Examination: 
General:   Weak looking although not in acute distress Eyes:   pink conjunctivae, PERRLA with no discharge. ENT:   no ottorrhea or rhinorrhea. Moist mucous membranes Pulm:  clear breath sounds without crackles or wheezes Card: has regular and normal S1, S2 without thrills, bruits or peripheral edema Abd:  Soft, non-tender, non-distended, normoactive bowel sounds Musc:  No cyanosis, clubbing, atrophy or deformities. Skin:  No rashes, bruising or ulcers. Neuro: Awake and alert. Generally a non focal exam.  
Psych:  Has a good insight and is oriented x 3 Current Facility-Administered Medications Medication Dose Route Frequency  insulin glargine (LANTUS) injection 25 Units  25 Units SubCUTAneous DAILY  benzocaine-menthol (CEPACOL) lozenge 1 Lozenge  1 Lozenge Mucous Membrane Q6H PRN  
 fluconazole (DIFLUCAN) tablet 200 mg  200 mg Oral DAILY  sodium chloride (NS) flush 5-10 mL  5-10 mL IntraVENous Q8H  
  sodium chloride (NS) flush 5-10 mL  5-10 mL IntraVENous PRN  
 acetaminophen (TYLENOL) tablet 650 mg  650 mg Oral Q4H PRN  
 naloxone (NARCAN) injection 0.4 mg  0.4 mg IntraVENous PRN  
 enoxaparin (LOVENOX) injection 40 mg  40 mg SubCUTAneous Q24H  
 0.9% sodium chloride infusion  100 mL/hr IntraVENous CONTINUOUS  
 ondansetron (ZOFRAN) injection 4 mg  4 mg IntraVENous Q4H PRN  
 insulin lispro (HUMALOG) injection   SubCUTAneous QID WITH MEALS  glucose chewable tablet 16 g  4 Tab Oral PRN  
 dextrose (D50W) injection syrg 12.5-25 g  12.5-25 g IntraVENous PRN  
 glucagon (GLUCAGEN) injection 1 mg  1 mg IntraMUSCular PRN  piperacillin-tazobactam (ZOSYN) 3.375 g in 0.9% sodium chloride (MBP/ADV) 100 mL  3.375 g IntraVENous Q8H Laboratory data and review: 
 
Recent Labs 11/10/18 
0403 11/08/18 
1534 WBC 10.1 15.2* HGB 10.5* 12.1 HCT 33.4* 38.0  
* 442* Recent Labs 11/10/18 
0403 11/08/18 
1534  132* K 3.9 3.7  96* CO2 31 31 * 352* BUN 5* 9  
CREA 0.68 0.71  
CA 7.4* 7.9* ALB  --  2.3*  
SGOT  --  13* ALT  --  16 No components found for: [place] Assessment and Plan: 
 
Acute pyelonephritis (11/8/2018): involving right kidney. Concern for an abscess formation. Clinically she remains stable but with persistent bacteremia. Blood and urine cultures positive for E coli. Continue IVFs. IV Zosyn. Consult ID in AM.    
  
[facility]di[facility] mellitus[facility] (1/12/2016): uncontrolled with hyperglycemia. A1c 14.6. BG remains elevated. Increase lantus dosing. SSI per protocol. DM education. Hold metformin in the event we need a repeat CT 
  
Hyponatremia with extracellular fluid depletion (11/8/2018): due to poor intake and GI losses. This is now resolved. Monitor  
  
Diarrhea (11/8/2018): resolved. IVFs. Stool studies if persistent 
  
Odynophagia (11/8/2018): suspected Strep throat. Better. Continue current antibiotics. FRANK annean Total time spent for the patient's care: 35 Minutes Care Plan discussed with: Patient and Nursing Staff Discussed:  Care Plan and D/C Planning Prophylaxis:  Lovenox Anticipated Disposition:  Home w/Family 
        
___________________________________________________ Attending Physician:   Hortensia Sen MD

## 2018-11-11 NOTE — PROGRESS NOTES
Bedside and Verbal shift change report given to Afshin RN and MIGUEL ANGEL English (oncoming nurse) by Linda Lowry RN (offgoing nurse). Report included the following information SBAR, Kardex, Intake/Output, MAR, Accordion, Recent Results and Med Rec Status.

## 2018-11-11 NOTE — PROGRESS NOTES
Bedside and Verbal shift change report given to MIGUEL ANGEL Jaime  by Mike Johnson RN. Report included the following information SBAR, Kardex and MAR.

## 2018-11-12 PROBLEM — R13.10 ODYNOPHAGIA: Status: RESOLVED | Noted: 2018-11-08 | Resolved: 2018-11-12

## 2018-11-12 PROBLEM — E87.1 HYPONATREMIA WITH EXTRACELLULAR FLUID DEPLETION: Status: RESOLVED | Noted: 2018-11-08 | Resolved: 2018-11-12

## 2018-11-12 PROBLEM — R19.7 DIARRHEA: Status: RESOLVED | Noted: 2018-11-08 | Resolved: 2018-11-12

## 2018-11-12 LAB
GLUCOSE BLD STRIP.AUTO-MCNC: 175 MG/DL (ref 65–100)
GLUCOSE BLD STRIP.AUTO-MCNC: 195 MG/DL (ref 65–100)
GLUCOSE BLD STRIP.AUTO-MCNC: 215 MG/DL (ref 65–100)
GLUCOSE BLD STRIP.AUTO-MCNC: 241 MG/DL (ref 65–100)
SERVICE CMNT-IMP: ABNORMAL

## 2018-11-12 PROCEDURE — 74011250636 HC RX REV CODE- 250/636: Performed by: INTERNAL MEDICINE

## 2018-11-12 PROCEDURE — 74011000258 HC RX REV CODE- 258: Performed by: INTERNAL MEDICINE

## 2018-11-12 PROCEDURE — 82962 GLUCOSE BLOOD TEST: CPT

## 2018-11-12 PROCEDURE — 94760 N-INVAS EAR/PLS OXIMETRY 1: CPT

## 2018-11-12 PROCEDURE — 36415 COLL VENOUS BLD VENIPUNCTURE: CPT

## 2018-11-12 PROCEDURE — 74011636637 HC RX REV CODE- 636/637: Performed by: INTERNAL MEDICINE

## 2018-11-12 PROCEDURE — 65270000029 HC RM PRIVATE

## 2018-11-12 PROCEDURE — 74011250637 HC RX REV CODE- 250/637: Performed by: INTERNAL MEDICINE

## 2018-11-12 PROCEDURE — 87040 BLOOD CULTURE FOR BACTERIA: CPT

## 2018-11-12 RX ORDER — LANCING DEVICE
1 EACH MISCELLANEOUS
Qty: 1 EACH | Refills: 1 | Status: SHIPPED | OUTPATIENT
Start: 2018-11-12 | End: 2018-12-12 | Stop reason: SDUPTHER

## 2018-11-12 RX ORDER — METFORMIN HYDROCHLORIDE 500 MG/1
500 TABLET ORAL 2 TIMES DAILY WITH MEALS
Status: DISCONTINUED | OUTPATIENT
Start: 2018-11-12 | End: 2018-11-13 | Stop reason: HOSPADM

## 2018-11-12 RX ORDER — SODIUM CHLORIDE 9 MG/ML
500 INJECTION, SOLUTION INTRAVENOUS ONCE
Status: DISCONTINUED | OUTPATIENT
Start: 2018-11-12 | End: 2018-11-12

## 2018-11-12 RX ORDER — SODIUM CHLORIDE 9 MG/ML
500 INJECTION, SOLUTION INTRAVENOUS ONCE
Status: DISCONTINUED | OUTPATIENT
Start: 2018-11-12 | End: 2018-11-12 | Stop reason: DRUGHIGH

## 2018-11-12 RX ORDER — CIPROFLOXACIN 500 MG/1
750 TABLET ORAL EVERY 12 HOURS
Status: DISCONTINUED | OUTPATIENT
Start: 2018-11-12 | End: 2018-11-13 | Stop reason: HOSPADM

## 2018-11-12 RX ORDER — LANCETS 33 GAUGE
EACH MISCELLANEOUS
Qty: 100 LANCET | Refills: 2 | Status: SHIPPED | OUTPATIENT
Start: 2018-11-12 | End: 2018-12-12 | Stop reason: SDUPTHER

## 2018-11-12 RX ADMIN — Medication 10 ML: at 14:00

## 2018-11-12 RX ADMIN — CIPROFLOXACIN 750 MG: 500 TABLET, FILM COATED ORAL at 13:00

## 2018-11-12 RX ADMIN — INSULIN LISPRO 3 UNITS: 100 INJECTION, SOLUTION INTRAVENOUS; SUBCUTANEOUS at 17:09

## 2018-11-12 RX ADMIN — INSULIN LISPRO 3 UNITS: 100 INJECTION, SOLUTION INTRAVENOUS; SUBCUTANEOUS at 13:37

## 2018-11-12 RX ADMIN — METFORMIN HYDROCHLORIDE 500 MG: 500 TABLET ORAL at 09:00

## 2018-11-12 RX ADMIN — METFORMIN HYDROCHLORIDE 500 MG: 500 TABLET ORAL at 17:09

## 2018-11-12 RX ADMIN — Medication 5 ML: at 22:00

## 2018-11-12 RX ADMIN — SODIUM CHLORIDE 500 ML: 900 INJECTION, SOLUTION INTRAVENOUS at 13:36

## 2018-11-12 RX ADMIN — ENOXAPARIN SODIUM 40 MG: 40 INJECTION SUBCUTANEOUS at 20:32

## 2018-11-12 RX ADMIN — INSULIN LISPRO 2 UNITS: 100 INJECTION, SOLUTION INTRAVENOUS; SUBCUTANEOUS at 09:26

## 2018-11-12 RX ADMIN — FLUCONAZOLE 200 MG: 100 TABLET ORAL at 09:00

## 2018-11-12 RX ADMIN — CIPROFLOXACIN 750 MG: 500 TABLET, FILM COATED ORAL at 20:32

## 2018-11-12 RX ADMIN — INSULIN HUMAN 15 UNITS: 100 INJECTION, SUSPENSION SUBCUTANEOUS at 17:09

## 2018-11-12 RX ADMIN — PIPERACILLIN SODIUM,TAZOBACTAM SODIUM 3.38 G: 3; .375 INJECTION, POWDER, FOR SOLUTION INTRAVENOUS at 03:37

## 2018-11-12 RX ADMIN — INSULIN GLARGINE 25 UNITS: 100 INJECTION, SOLUTION SUBCUTANEOUS at 09:00

## 2018-11-12 NOTE — PROGRESS NOTES
1648: 
Pt was provided a  when [employer] were called to establish a PCP. Will call back tomorrow to see who she has an appt with and when. MIGUEL ANGEL Del Rosario 
 
CM Note: Pt was given a list of [employer].   MIGUEL ANGEL Del Rosario

## 2018-11-12 NOTE — PROGRESS NOTES
Bedside and Verbal shift change report given to Spencer Hannah RN (oncoming nurse) by Mandi Powell RN (offgoing nurse). Report included the following information SBAR, Kardex, Intake/Output, MAR, Accordion, Recent Results, Med Rec Status and Cardiac Rhythm NSR.

## 2018-11-12 NOTE — CONSULTS
[address] [facility] Maynor Morelos  MR#: [id number]  : 1965  ACCOUNT #: [de-identified]   DATE OF SERVICE: 2018    REQUESTING PHYSICIAN:  Dr. Toni Fournier. CHIEF COMPLAINT:  Abdominal pain. HISTORY OF PRESENT ILLNESS:  Patient is a 61-year-old female with past medical history significant for poorly controlled diabetes mellitus and recurrent urinary tract infections who was admitted with the aforementioned complaint. According to chart records, the patient developed worsening right-sided flank and abdominal pain over the week prior to admission. CT scan in the emergency department revealed severe right-sided pyelonephritis. Blood cultures are growing Escherichia coli. The infectious disease service has been asked to assist with antibiotic management. PAST MEDICAL HISTORY:  Diabetes mellitus. SOCIAL HISTORY:  No alcohol, tobacco or illicit drug use. FAMILY HISTORY:  Diabetes mellitus. ALLERGIES:  NO KNOWN DRUG ALLERGIES. OUTPATIENT MEDICATIONS:  Please see body of chart for details. She was not on antibiotics prior to admission. REVIEW OF SYSTEMS:  As per the HPI. Remainder of system review of systems unremarkable. LABORATORY DATA:  White blood cell count is 10,100, platelet count 065,648. Creatinine is 0.68. Blood cultures are growing Escherichia coli which is resistant to ampicillin and trimethoprim sulfamethoxazole. Repeat blood cultures are pending. CT scan of the abdomen and pelvis reveals extensive parenchymal abnormality throughout the right kidney suggestive of severe acute pyelonephritis/focal nephritis with possible developing parenchymal abscess in scattered areas. PHYSICAL EXAMINATION:  VITAL SIGNS:  Temperature 98.5, maximum temperature is less than 100, heart rate 84 beats per minute, blood pressure 87/53, respiratory rate 16, oxygen saturation 97% on room air. GENERAL:  Alert, no acute distress.   HEENT: Normocephalic, atraumatic. Mucous membranes moist.  PULMONARY:  No distress. GENITOURINARY:  No CVA tenderness to percussion. EXTREMITIES:  No clubbing, cyanosis or edema. SKIN:  No rashes. NEUROLOGIC:  Cranial nerves II-XII grossly intact. ASSESSMENT AND PLAN:  1. Right-sided pyelonephritis with possible early developing abscess complicated by Escherichia coli bacteremia. Repeat blood cultures are pending, need to document sterilization of the blood. 2.  Piperacillin-tazobactam will be narrowed to high dose ciprofloxacin. She may complete therapy with oral ciprofloxacin at the time of discharge. Due to the severity of the pyelonephritis with possible underlying abscess recommends urology evaluation. Further recommendations pending results of above. 3.  Diabetes mellitus. This is poorly controlled. Hemoglobin A1c is 14.6. Thank you for allowing me to participate in the care of this patient.       DO VANNESA Alvarenga / RAMOS  D: 11/12/2018 12:14     T: 11/12/2018 12:36  JOB #: [id number]

## 2018-11-12 NOTE — PROGRESS NOTES
Bedside and Verbal shift change report given to [employer] (oncoming nurse) by Keon Up (offgoing nurse). Report included the following information SBAR, Kardex and ED Summary.

## 2018-11-12 NOTE — PROGRESS NOTES
Brandon Lugo lashay [place] [address] 
[address] [facility], [address] [place] [address] 
[phone] Medical Progress Note NAME:         Ho Schmid :        1965 MRM:        [id number] Date:          2018 Subjective: Patient has been seen and examined as a follow up for acute pyelonephritis. Chart, labs, diagnostics reviewed. She says she feels much much better today. No abdominal or flank discomfort. Tolerating diet well. Objective: 
 
Vital Signs: 
 
Visit Vitals BP (!) 87/53 (BP 1 Location: Left arm, BP Patient Position: At rest) Pulse 84 Temp 98.5 °F (36.9 °C) Resp 16 Wt 57.2 kg (126 lb) SpO2 97% Breastfeeding? No  
BMI 25.61 kg/m² Intake/Output Summary (Last 24 hours) at 2018 5156 Last data filed at 2018 3134 Gross per 24 hour Intake 240 ml Output  Net 240 ml Physical Examination: 
General:   Weak looking although not in acute distress Eyes:   pink conjunctivae, PERRLA with no discharge. Pulm:  clear breath sounds without crackles or wheezes Card: has regular and normal S1, S2 without thrills, bruits or peripheral edema Abd:  Soft, non-tender, non-distended, normoactive bowel sounds Musc:  No cyanosis, clubbing, atrophy or deformities. Skin:  No rashes, bruising or ulcers. Neuro: Awake and alert. Generally a non focal exam.  
Psych:  Has a good insight and is oriented x 3 Current Facility-Administered Medications Medication Dose Route Frequency  metFORMIN (GLUCOPHAGE) tablet 500 mg  500 mg Oral BID WITH MEALS  insulin glargine (LANTUS) injection 25 Units  25 Units SubCUTAneous DAILY  benzocaine-menthol (CEPACOL) lozenge 1 Lozenge  1 Lozenge Mucous Membrane Q6H PRN  
 sodium chloride (NS) flush 5-10 mL  5-10 mL IntraVENous Q8H  
 sodium chloride (NS) flush 5-10 mL  5-10 mL IntraVENous PRN  
  acetaminophen (TYLENOL) tablet 650 mg  650 mg Oral Q4H PRN  
 naloxone (NARCAN) injection 0.4 mg  0.4 mg IntraVENous PRN  
 enoxaparin (LOVENOX) injection 40 mg  40 mg SubCUTAneous Q24H  
 ondansetron (ZOFRAN) injection 4 mg  4 mg IntraVENous Q4H PRN  
 insulin lispro (HUMALOG) injection   SubCUTAneous QID WITH MEALS  glucose chewable tablet 16 g  4 Tab Oral PRN  
 dextrose (D50W) injection syrg 12.5-25 g  12.5-25 g IntraVENous PRN  
 glucagon (GLUCAGEN) injection 1 mg  1 mg IntraMUSCular PRN  piperacillin-tazobactam (ZOSYN) 3.375 g in 0.9% sodium chloride (MBP/ADV) 100 mL  3.375 g IntraVENous Q8H Laboratory data and review: 
 
Recent Labs 11/10/18 
0403 WBC 10.1 HGB 10.5* HCT 33.4*  
* Recent Labs 11/10/18 
0403   
K 3.9  CO2 31  
* BUN 5*  
CREA 0.68  
CA 7.4* No components found for: [place] Assessment and Plan: 
 
Acute pyelonephritis (11/8/2018): involving right kidney. Concern for an abscess formation. Clinically she remains stable but with persistent bacteremia. Blood and urine cultures positive for E coli. Follow up blood Cx neg. Continue IV Zosyn. Consults with ID and urology.     
  
[facility] (1/12/2016): uncontrolled with hyperglycemia. A1c 14.6. BG better controlled. Continue Lantus, metformin, SSI per protocol. DM education.  
  
Hyponatremia with extracellular fluid depletion (11/8/2018): due to poor intake and GI losses. This is now resolved. Monitor  
  
Diarrhea (11/8/2018): resolved. IVFs. Stool studies if persistent 
  
Odynophagia (11/8/2018): suspected Strep throat. Better. Continue current antibiotics. Total time spent for the patient's care: 30 Minutes Care Plan discussed with: Patient and Nursing Staff Discussed:  Care Plan and D/C Planning Prophylaxis:  Lovenox Anticipated Disposition:  Home w/Family ___________________________________________________ Attending Physician:   Elise Mcmullen MD

## 2018-11-12 NOTE — CONSULTS
Requesting Provider: Hal Weston MD  Reason for Consultation: ? Renal abscess  Pre-existing [facility] Patient:   No                Patient: Sheela Sandifer MRN: [id number]  SSN: [id number]    YOB: 1965  Age: 48 y.o. Sex: female     Location: [facility]       Code Status: Full Code   PCP: None  - None   Emergency Contact:  Primary Emergency Contact: Maged Donnelly   Race/Muslim/Language: Keshawn Hua / Caden Newton / Cole RIVERA   Payor: Payor: Vanda Minor / Plan: Yamilka Portillo / Product Type: Self Pay /    Prior Admission Data:         Hospitalized:  Hospital Day: 5 - Admitted 2018  3:26 PM   POD # * No surgery found *  by * Surgery not found * - Blood Loss: * No surgery found * * Surgery not found *     CONSULTANTS  IP CONSULT TO INFECTIOUS DISEASES  IP CONSULT TO [address] [place] [address]    ICD-10-CM ICD-9-CM   1. Acute pyelonephritis N10 590.10         Assessment/Plan:     · Severe r pyelo, dm, ? Early abscess. Agree with ID recs  · Will need to be seen as op and follow up imaging in 6 weeks inless she gets worse  · Stones non obs at present     CC: Abdominal Pain   HPI: She is a 48 y.o. female admitted yesterday with c/o several days of malaise, R flank pain and diarrhea. Ct c/w severe pyelo and possible early abscess. ? Stones as well, ct reviewed. No real prior h/o utis. I am asked to see.     She is feeling better    Temp (24hrs), Av.2 °F (36.8 °C), Min:97.5 °F (36.4 °C), Max:98.9 °F (37.2 °C)    Creatinine   Date/Time Value Ref Range Status   11/10/2018 04:03 AM 0.68 0.55 - 1.02 MG/DL Final   2018 03:34 PM 0.71 0.55 - 1.02 MG/DL Final   2016 01:22 PM 0.58 0.55 - 1.02 MG/DL Final     Current Antimicrobial Therapy (168h ago, onward)    Ordered     Start Stop    18 1209  ciprofloxacin HCl (CIPRO) tablet 750 mg  750 mg,   Oral,   EVERY 12 HOURS      18 1300 --        Diet: DIET DIABETIC CONSISTENT CARB Regular - % Diet Eaten: 100 %  Urinary Status: Voiding     Labs     Lab Results   Component Value Date/Time    WBC 10.1 11/10/2018 04:03 AM    HCT 33.4 (L) 11/10/2018 04:03 AM    PLATELET 561 (H) 70/93/5140 04:03 AM    Sodium 138 11/10/2018 04:03 AM    Potassium 3.9 11/10/2018 04:03 AM    Chloride 103 11/10/2018 04:03 AM    CO2 31 11/10/2018 04:03 AM    BUN 5 (L) 11/10/2018 04:03 AM    Creatinine 0.68 11/10/2018 04:03 AM    Glucose 230 (H) 11/10/2018 04:03 AM    Calcium 7.4 (L) 11/10/2018 04:03 AM     UA:   Lab Results   Component Value Date/Time    Color YELLOW/STRAW 11/08/2018 03:34 PM    Appearance CLEAR 11/08/2018 03:34 PM    Specific gravity 1.028 11/08/2018 03:34 PM    pH (UA) 6.5 11/08/2018 03:34 PM    Protein NEGATIVE  11/08/2018 03:34 PM    Glucose >1,000 (A) 11/08/2018 03:34 PM    Ketone 40 (A) 11/08/2018 03:34 PM    Bilirubin NEGATIVE  11/08/2018 03:34 PM    Urobilinogen 1.0 11/08/2018 03:34 PM    Nitrites NEGATIVE  11/08/2018 03:34 PM    Leukocyte Esterase NEGATIVE  11/08/2018 03:34 PM    Epithelial cells FEW 11/08/2018 03:34 PM    Bacteria NEGATIVE  11/08/2018 03:34 PM    WBC 10-20 11/08/2018 03:34 PM    RBC 5-10 11/08/2018 03:34 PM     Imaging     Results for orders placed during the hospital encounter of 11/08/18   CT ABD PELV W CONT    Narrative EXAM:  CT ABD PELV W CONT    INDICATION: Abdominal pain  abdominal pain and fever. Patient feels like she has  a UTI. COMPARISON: None    CONTRAST:  100 mL of Isovue-370. TECHNIQUE:   Following the uneventful intravenous administration of contrast, thin axial  images were obtained through the abdomen and pelvis. Coronal and sagittal  reconstructions were generated. Oral contrast was not administered. CT dose  reduction was achieved through use of a standardized protocol tailored for this  examination and automatic exposure control for dose modulation. FINDINGS:   LUNG BASES: Clear. INCIDENTALLY IMAGED HEART AND MEDIASTINUM: Unremarkable. LIVER: No mass or biliary dilatation.   GALLBLADDER: Unremarkable. SPLEEN: No mass. PANCREAS: No mass or ductal dilatation. ADRENALS: Unremarkable. KIDNEYS: The right kidney is enlarged and heterogeneously enhancing, with  wedge-shaped and rounded hypodense areas throughout the cortex diffusely, in  areas with poor enhancement relative to normal parenchyma. There are small  hypodense collections suggested, most significant in the upper pole, but present  throughout the kidney. There is a large scar in the right mid pole  posterolaterally. Nonobstructing intrarenal calcifications are suspected. The  left kidney is unremarkable except for scattered focal cortical scarring. The  ureters show no definite dilatation. The renal vein is patent bilaterally. STOMACH: Unremarkable. SMALL BOWEL: No dilatation or wall thickening. COLON: No dilatation or wall thickening. APPENDIX: Unremarkable. PERITONEUM: No ascites or pneumoperitoneum. RETROPERITONEUM: No lymphadenopathy or aortic aneurysm. REPRODUCTIVE ORGANS: The uterus is unremarkable for age. URINARY BLADDER: No mass or calculus. BONES: No destructive bone lesion. ADDITIONAL COMMENTS: N/A      Impression IMPRESSION:    1. Extensive parenchymal abnormality throughout the right kidney most suggestive  of severe acute pyelonephritis/focal nephritis, with possible developing  parenchymal abscess in scattered areas. There are nonobstructing intrarenal  calculi but no hydroureteronephrosis. Follow-up is suggested, as clinically  appropriate, to exclude developing renal abscess as well as neoplasm, although  this is much less likely. 2. Focal cortical scarring bilaterally, consistent with prior urinary  inflammatory disease. US Results (most recent):  No results found for this or any previous visit.     Cultures     All Micro Results     Procedure Component Value Units Date/Time    CULTURE, BLOOD [[id number]] Collected:  11/12/18 9983    Order Status:  Completed Specimen:  Blood Updated:  11/12/18 8726 CULTURE, BLOOD, PAIRED [[id number]]  (Abnormal)  (Susceptibility) Collected:  11/08/18 1833    Order Status:  Completed Specimen:  Blood Updated:  11/11/18 0656     Special Requests: NO SPECIAL REQUESTS        Culture result:       ESCHERICHIA COLI GROWING IN 3 OF 4 BOTTLES DRAWN                  REMAINING BOTTLE(S) HAS/HAVE NO GROWTH SO FAR          CULTURE, URINE [[id number]]  (Abnormal)  (Susceptibility) Collected:  11/08/18 1534    Order Status:  Completed Specimen:  Urine from Clean catch Updated:  11/10/18 1002     Special Requests: NO SPECIAL REQUESTS        [place] Count --        18991  COLONIES/mL       Culture result: ESCHERICHIA COLI       CULTURE, THROAT [[id number]]  (Abnormal) Collected:  11/08/18 1907    Order Status:  Completed Specimen:  Throat Updated:  11/10/18 0939     Special Requests: NO SPECIAL REQUESTS        Culture result:       LIGHT STREPTOCOCCI, BETA HEMOLYTIC GROUP C                  HEAVY NORMAL RESPIRATORY ISAIAS          STREP Guillermo Harry A [[id number]] Collected:  11/08/18 1907    Order Status:  Completed Specimen:  Throat from Serum Updated:  11/08/18 1935     Group A Strep Ag ID NEGATIVE        URINE CULTURE HOLD SAMPLE [[id number]] Collected:  11/08/18 1534    Order Status:  Completed Specimen:  Urine from Serum Updated:  11/08/18 1548     Urine culture hold       URINE ON HOLD IN MICROBIOLOGY DEPT FOR 3 DAYS. IF UNPRESERVED URINE IS SUBMITTED, IT CANNOT BE USED FOR ADDITIONAL TESTING AFTER 24 HRS, RECOLLECTION WILL BE REQUIRED.                  Past History: (Complete 2+/3 areas)   No Known Allergies   Current Facility-Administered Medications   Medication Dose Route Frequency    metFORMIN (GLUCOPHAGE) tablet 500 mg  500 mg Oral BID WITH MEALS    ciprofloxacin HCl (CIPRO) tablet 750 mg  750 mg Oral Q12H    insulin NPH (NOVOLIN N, HUMULIN N) injection 15 Units  15 Units SubCUTAneous ACB&D    benzocaine-menthol (CEPACOL) lozenge 1 Lozenge  1 Lozenge Mucous Membrane Q6H PRN    sodium chloride (NS) flush 5-10 mL  5-10 mL IntraVENous Q8H    sodium chloride (NS) flush 5-10 mL  5-10 mL IntraVENous PRN    acetaminophen (TYLENOL) tablet 650 mg  650 mg Oral Q4H PRN    naloxone (NARCAN) injection 0.4 mg  0.4 mg IntraVENous PRN    enoxaparin (LOVENOX) injection 40 mg  40 mg SubCUTAneous Q24H    ondansetron (ZOFRAN) injection 4 mg  4 mg IntraVENous Q4H PRN    insulin lispro (HUMALOG) injection   SubCUTAneous QID WITH MEALS    glucose chewable tablet 16 g  4 Tab Oral PRN    dextrose (D50W) injection syrg 12.5-25 g  12.5-25 g IntraVENous PRN    glucagon (GLUCAGEN) injection 1 mg  1 mg IntraMUSCular PRN    Prior to Admission medications    Medication Sig Start Date End Date Taking? Authorizing Provider   Insulin Syringes, Disposable, 1 mL syrg 100 Each by Does Not Apply route Before breakfast, lunch, and dinner. 11/12/18  Yes Lord Marilynn MD   glucose blood VI test strips (BLOOD GLUCOSE TEST) strip Test as intructed 11/12/18  Yes Lord Marilynn MD   Lancing Device misc 1 Device by Does Not Apply route Before breakfast, lunch, and dinner. 11/12/18  Yes Lord Marilynn MD   lancets (MICRO THIN LANCETS) 33 gauge misc Test blood glucose as instructed 11/12/18  Yes Lord Marilynn MD        PMHx:  has a past medical history of DM (diabetes mellitus) ([facility] [address] PSurgHx:  has no past surgical history on file. PSocHx:  reports that  has never smoked. She does not have any smokeless tobacco history on file. She reports that she does not drink alcohol or use drugs.    ROS:  (Complete - 10 systems) - positives are bolded : Weightloss (Constitutional), Dry mouth (ENMT), Chest pain (CV), SOB (Respiratory), Constipation (GI), Weakness (MS), Pallor (Skin), TIA Sx (Neuro), Confusion (Psych), Easy bruising (Heme)    Physical Exam: (Comprehesive - 8+ 1995 Systems)     (1) Constitutional:  FIO2:   on SpO2: O2 Sat (%): 98 %  O2 Device: Room air    Patient Vitals for the past 24 hrs:   BP Temp Pulse Resp SpO2   11/12/18 1529 99/62 98.1 °F (36.7 °C) 69 18 98 %   11/12/18 1253 107/58 98.9 °F (37.2 °C) 82 16 98 %   11/12/18 0858 (!) 87/53 98.5 °F (36.9 °C) 84 16 97 %   11/12/18 0445 (!) 89/58 98 °F (36.7 °C) 72 18 99 %   11/12/18 0043 96/64 97.5 °F (36.4 °C) 73 16 99 %   11/11/18 1925 104/67 98.3 °F (36.8 °C) 77 16 98 %       Date 11/11/18 0700 - 11/12/18 0659 11/12/18 0700 - 11/13/18 0659   Shift 9879-3565 5482-0937 24 Hour Total 4119-7720 5344-0394 24 Hour Total   INTAKE   P.O. 120  120 240  240     P. O. 120  120 240  240   I. V.(mL/kg/hr)  0(0) 0(0)        Volume (0.9% sodium chloride infusion)  0 0      Shift Total(mL/kg) 120(2.1) 0(0) 120(2.1) 240(4.2)  240(4.2)   OUTPUT   Shift Total(mL/kg)          0 120 240  240   Weight (kg) 57.2 57.2 57.2 57.2 57.2 57.2      (2) ENMT:   moist mucous membranes   (3) Respiratory:  breathing easily   (4) GI:  no abdominal masses, mild RUQ tenderness, no hepatosplenomegaly, no ventral hernia, stool for occult blood not indicated as urologist.   (5) :      (6) Lymphatic:  no adenopathy   (7) Muscloskeletal:  no gross deformity   (8) Skin:  no rash   (9) Neuro:  no focal deficits      Signed By: Cristi Pickett MD  - November 12, 2018

## 2018-11-12 NOTE — PROGRESS NOTES
Attempted to scheduled an New Patient with [facility] but the office said the information that is in [facility] does not match what is in [facility] so they need the patient to call and schedule their own appointment. Due to a language barrier and no  to translate this message. I have emailing Papo hSea CM who requested the appointment to relay the message.

## 2018-11-13 VITALS
RESPIRATION RATE: 17 BRPM | WEIGHT: 126 LBS | OXYGEN SATURATION: 99 % | DIASTOLIC BLOOD PRESSURE: 61 MMHG | HEART RATE: 88 BPM | TEMPERATURE: 98.8 F | BODY MASS INDEX: 25.61 KG/M2 | SYSTOLIC BLOOD PRESSURE: 94 MMHG

## 2018-11-13 LAB
GLUCOSE BLD STRIP.AUTO-MCNC: 171 MG/DL (ref 65–100)
SERVICE CMNT-IMP: ABNORMAL

## 2018-11-13 PROCEDURE — 74011636637 HC RX REV CODE- 636/637: Performed by: INTERNAL MEDICINE

## 2018-11-13 PROCEDURE — 94760 N-INVAS EAR/PLS OXIMETRY 1: CPT

## 2018-11-13 PROCEDURE — 74011250637 HC RX REV CODE- 250/637: Performed by: INTERNAL MEDICINE

## 2018-11-13 PROCEDURE — 82962 GLUCOSE BLOOD TEST: CPT

## 2018-11-13 RX ORDER — METFORMIN HYDROCHLORIDE 500 MG/1
500 TABLET ORAL 2 TIMES DAILY WITH MEALS
Qty: 60 TAB | Refills: 0 | Status: SHIPPED | OUTPATIENT
Start: 2018-11-13 | End: 2018-12-13

## 2018-11-13 RX ORDER — CIPROFLOXACIN 500 MG/1
500 TABLET ORAL 2 TIMES DAILY
Qty: 56 TAB | Refills: 0 | Status: SHIPPED | OUTPATIENT
Start: 2018-11-13 | End: 2018-12-11

## 2018-11-13 RX ADMIN — INSULIN LISPRO 2 UNITS: 100 INJECTION, SOLUTION INTRAVENOUS; SUBCUTANEOUS at 08:00

## 2018-11-13 RX ADMIN — Medication 5 ML: at 06:00

## 2018-11-13 RX ADMIN — METFORMIN HYDROCHLORIDE 500 MG: 500 TABLET ORAL at 08:14

## 2018-11-13 RX ADMIN — CIPROFLOXACIN 750 MG: 500 TABLET, FILM COATED ORAL at 08:14

## 2018-11-13 RX ADMIN — INSULIN HUMAN 15 UNITS: 100 INJECTION, SUSPENSION SUBCUTANEOUS at 08:14

## 2018-11-13 NOTE — PROGRESS NOTES
New Patient PCP BRENNEN appt scheduled with Dr. Willis Barber on 11/14/2018 at 10:20am Appt added to AVS. Dalton Turcios CM Specialist

## 2018-11-13 NOTE — PROGRESS NOTES
[facility] Progress Note Alex Perdomo  
  [phone] Office [phone]  Fax 2018 Assessment & Plan: 1. Right-sided pyelonephritis with possible early developing abscess complicated by Escherichia coli bacteremia. Repeat blood cultures NGSF. Abx deescalated to cipro 500mg po BID. Due to bacteremia and severity of pyelonephritis will plan 28 days treatment. Patient to f/u with urology in 6 weeks for repeat imaging. 2. Diabetes mellitus. This is poorly controlled. Hemoglobin A1c is 14.6. Subjective: No complaints Objective:  
 
Vitals:  
Visit Vitals BP 94/61 (BP 1 Location: Left arm, BP Patient Position: Sitting) Pulse 88 Temp 98.8 °F (37.1 °C) Resp 17 Wt 57.2 kg (126 lb) SpO2 99% Breastfeeding? No  
BMI 25.61 kg/m² Tmax:  Temp (24hrs), Av.8 °F (37.1 °C), Min:98.1 °F (36.7 °C), Max:99.5 °F (37.5 °C) Exam:  
Patient is intubated:  no 
 
Physical Examination:  
General:  Alert, cooperative, no distress Head:  Normocephalic, atraumatic. Eyes:  Conjunctivae clear Neck: Supple Lungs:   No distress. Chest wall:    
Heart:    
Abdomen:     
Extremities: Moves all. Skin: No rash Neurologic: CNII-XII intact. Normal strength Labs:     
 
No lab exists for component: ITNL No results for input(s): CPK, CKMB, TROIQ in the last 72 hours. No results for input(s): NA, K, CL, CO2, BUN, CREA, GLU, PHOS, MG, ALB, WBC, HGB, HCT, PLT, HGBEXT, HCTEXT, PLTEXT in the last 72 hours. No lab exists for component:  CA No results for input(s): INR, PTP, APTT in the last 72 hours. No lab exists for component: INREXT Needs: urine analysis, urine sodium, protein and creatinine No results found for: [place] [employer] Cultures: No results found for: SDES Lab Results Component Value Date/Time  Culture result: NO GROWTH AFTER 22 HOURS 2018 07:52 AM  
 Culture result: LIGHT STREPTOCOCCI, BETA HEMOLYTIC GROUP C (A) 11/08/2018 07:07 PM  
 Culture result: HEAVY NORMAL RESPIRATORY ISAIAS 11/08/2018 07:07 PM  
 
 
Radiology:  
 
Medications Current Facility-Administered Medications Medication Dose Route Frequency Last Dose  metFORMIN (GLUCOPHAGE) tablet 500 mg  500 mg Oral BID WITH MEALS 500 mg at 11/13/18 5382  ciprofloxacin HCl (CIPRO) tablet 750 mg  750 mg Oral Q12H 750 mg at 11/13/18 0814  
 insulin NPH (NOVOLIN N, HUMULIN N) injection 15 Units  15 Units SubCUTAneous ACB&D 15 Units at 11/13/18 8163  benzocaine-menthol (CEPACOL) lozenge 1 Lozenge  1 Lozenge Mucous Membrane Q6H PRN 1 Lozenge at 11/10/18 0757  sodium chloride (NS) flush 5-10 mL  5-10 mL IntraVENous Q8H 5 mL at 11/13/18 0600  
 sodium chloride (NS) flush 5-10 mL  5-10 mL IntraVENous PRN    
 acetaminophen (TYLENOL) tablet 650 mg  650 mg Oral Q4H  mg at 11/09/18 0802  
 naloxone (NARCAN) injection 0.4 mg  0.4 mg IntraVENous PRN    
 enoxaparin (LOVENOX) injection 40 mg  40 mg SubCUTAneous Q24H 40 mg at 11/12/18 2032  ondansetron (ZOFRAN) injection 4 mg  4 mg IntraVENous Q4H PRN    
 insulin lispro (HUMALOG) injection   SubCUTAneous QID WITH MEALS 2 Units at 11/13/18 0800  
 glucose chewable tablet 16 g  4 Tab Oral PRN    
 dextrose (D50W) injection syrg 12.5-25 g  12.5-25 g IntraVENous PRN    
 glucagon (GLUCAGEN) injection 1 mg  1 mg IntraMUSCular PRN Case discussed with: 
 
 
Fior Sapp DO

## 2018-11-13 NOTE — DISCHARGE INSTRUCTIONS
HOSPITALIST DISCHARGE INSTRUCTIONS    NAME:             Smith Matute   :  1965   MRN:  [id number]     Date:     2018    ADMIT DATE: 2018     DISCHARGE DATE: 2018     PRINCIPAL ADMITTING DIAGNOSIS:  Acute pyelonephritis    DISCHARGE DIAGNOSES:  Principal Problem:    Acute pyelonephritis (2018)    DM (diabetes mellitus), type 2 ([address] (2016)    MEDICATIONS:    · It is important that medications are taken exactly as they are prescribed on the discharge medication instructions and keep them your  in the bottles provided by the pharmacist.   · Keep a list of the medication names, dosages, and times to be taken at all times. · Do not take other medications without consulting your doctor. · Check your blood glucose three times before meals    Recommended diet:  Diabetic Diet    Recommended activity: Activity as tolerated    Post discharge care:    Notify follow up health care provider or return to the emergency department if you cannot get hold of your doctor if you feel worse or experience symptoms similar to those that brought you to hospital    Follow-up Information     Follow up With Specialties Details Why [address] [place] [address]  Schedule an appointment as soon as possible for a visit  [address] [address]  [phone]    Juana Causey MD Urology Schedule an appointment as soon as possible for a visit to schedule follow up for kidney infection [address] [facility]  [phone]            Information obtained by :  I understand that if any problems occur once I am at home I am to contact my physician and I understand and acknowledge receipt of the instructions indicated above.                                                                                                                                            Physician's or R.N.'s Signature Date/Time                                                                                                                                              Patient or Representative Signature                                                          Date/Time                        [address] November 13, 2018       RE: Reji Dickerson      To Whom It May Concern: This is to certify that Reji Dickerson was hospitalized at [address] [place] [address] from 11/8/2018 to 11/13/2018 and may return to work on 11/19/18. Please feel free to contact my office at [phone] if you have any questions or concerns. Thank you for your assistance in this matter.       Sincerely,  Abisai Jensen MD

## 2018-11-13 NOTE — ROUTINE PROCESS
Bedside shift change report given to Deuce Eubanks RN (oncoming nurse) by Caty Rubio RN (offgoing nurse). Report included the following information SBAR, Kardex, Intake/Output, MAR, Accordion, Recent Results and Med Rec Status.

## 2018-11-13 NOTE — ROUTINE PROCESS
Bedside and Verbal shift change report given to Tomas Best RN (oncoming nurse) by Sondra Moreno RN (offgoing nurse). Report included the following information SBAR, Kardex, Procedure Summary, Intake/Output, MAR and Recent Results.

## 2018-11-13 NOTE — DISCHARGE SUMMARY
Brandon Lugo [facility] [place] [facility]  [address]  Tel: [phone]    Physician Discharge Summary    Patient ID:    Uli Harris  Age:              48 y.o.    : 1965  MRN:             [id number]     PCP: None     Admit date: 2018    Discharge date: 2018    Principal admission Diagnosis:   Acute pyelonephritis    Discharge Diagnoses:  Principal Problem:    Acute pyelonephritis (2018)    DM (diabetes mellitus), type 2 ([facility] [address] (2016)    Consults: ID and Urology    Hospital Course:     Ms. Carlitos Archibald is a 48 y.o. admitted to [facility] and treated for the following:    Acute pyelonephritis (2018): involving right kidney. Concern for an abscess formation. Clinically she remains stable. Blood and urine cultures positive for E coli. Follow up blood Cx neg. Seen by ID and urology. She will be discharged home on a prolonged course of Ciprofloxacin with a Urology follow up and repeat CT scan in 6 weeks.          DM (diabetes mellitus), type 2 ([facility] [address] (2016): uncontrolled with hyperglycemia. A1c 14.6. BG better controlled. Counseled on diet and medications. She will continue with NPH and Metformin. DM diet.       Diarrhea (2018): resolved. IVFs. Stool studies if persistent     Odynophagia (2018): suspected Strep throat. Resolved       Discharge Exam:    Visit Vitals  BP 94/61 (BP 1 Location: Left arm, BP Patient Position: Sitting)   Pulse 88   Temp 98.8 °F (37.1 °C)   Resp 17   Wt 57.2 kg (126 lb)   SpO2 99%   Breastfeeding?  No   BMI 25.61 kg/m²      General: well looking and stable patient in no acute distress  Pulm: clear breath sounds without wheezes  Card: no murmurs, normal S1, S2 without thrills, bruits   Abd:    soft, non-tender, normoactive bowel sounds  Skin: no rashes or ulcers, skin turgor is good  Neuro: awake, alert and has a non focal     Activity: Activity as tolerated    Diet: Diabetic Diet    Current Discharge Medication List      START taking these medications    Details   metFORMIN (GLUCOPHAGE) 500 mg tablet Take 1 Tab by mouth two (2) times daily (with meals) for 30 days. Qty: 60 Tab, Refills: 0      insulin NPH (NOVOLIN N, HUMULIN N) 100 unit/mL injection 15 Units by SubCUTAneous route Before breakfast and dinner. Qty: 1 Vial, Refills: 0      ciprofloxacin HCl (CIPRO) 500 mg tablet Take 1 Tab by mouth two (2) times a day for 28 days. Qty: 56 Tab, Refills: 0      Insulin Syringes, Disposable, 1 mL syrg 100 Each by Does Not Apply route Before breakfast, lunch, and dinner. Qty: 100 Syringe, Refills: 2      glucose blood VI test strips (BLOOD GLUCOSE TEST) strip Test as intructed  Qty: 100 Strip, Refills: 2      Lancing Device misc 1 Device by Does Not Apply route Before breakfast, lunch, and dinner. Qty: 1 Each, Refills: 1      lancets (MICRO THIN LANCETS) 33 gauge misc Test blood glucose as instructed  Qty: 100 Lancet, Refills: 2             Follow-up Information     Follow up With Specialties Details Why [facility]  Schedule an appointment as soon as possible for a visit  [address] [facility] [address] [place] [address]  [phone]    Raúl Cardoza MD Urology Schedule an appointment as soon as possible for a visit to schedule follow up for kidney infection [address] [address] [phone]      None    None (395) Patient stated that they have no PCP            Follow-up tests or labs: None    Discharge Condition: Stable    Disposition: home    Time taken to arrange discharge:  35 minutes.     Signed:  Bogdan Arteaga MD     [facility]  11/13/2018   9:39 AM

## 2018-11-13 NOTE — PROGRESS NOTES
phone used to introduce discharge nurse, assist patient in making a follow up appointment with PCP, and begin discharge paperwork process. Patient states, through , that all questions have been answered. 1200:  phone used to review all appointments, medication, discharge instructions and prescriptions. PIV removed. Patient given time to ask questions, all concerns addressed. Patient out of unit in wheelchair,  driving patient to home.

## 2018-11-13 NOTE — PROGRESS NOTES
TAHIR Note: 
Patient discharged to home with family to transport her. No NN.   MIGUEL ANGEL Del Rosario

## 2018-11-14 ENCOUNTER — OFFICE VISIT (OUTPATIENT)
Dept: FAMILY MEDICINE CLINIC | Age: 53
End: 2018-11-14

## 2018-11-14 VITALS
TEMPERATURE: 98.3 F | HEART RATE: 81 BPM | RESPIRATION RATE: 16 BRPM | SYSTOLIC BLOOD PRESSURE: 114 MMHG | HEIGHT: 58 IN | BODY MASS INDEX: 25.82 KG/M2 | WEIGHT: 123 LBS | DIASTOLIC BLOOD PRESSURE: 77 MMHG | OXYGEN SATURATION: 99 %

## 2018-11-14 DIAGNOSIS — Z76.89 ENCOUNTER FOR SUPPORT AND COORDINATION OF TRANSITION OF CARE: ICD-10-CM

## 2018-11-14 DIAGNOSIS — N12 PYELONEPHRITIS: Primary | ICD-10-CM

## 2018-11-14 DIAGNOSIS — E11.8 TYPE 2 DIABETES MELLITUS WITH COMPLICATION, WITHOUT LONG-TERM CURRENT USE OF INSULIN (HCC): ICD-10-CM

## 2018-11-14 LAB
BACTERIA SPEC CULT: ABNORMAL
BACTERIA SPEC CULT: ABNORMAL
SERVICE CMNT-IMP: ABNORMAL

## 2018-11-14 NOTE — PROGRESS NOTES
Hospital: [facility]  Dates of admission: 11/8/18  Discharge diagnoses: Acute pyelonephritis, T2DM  State of health at discharge: stable  Surgical or invasive procedures done: none        Amount and/or Complexity of Data Reviewed:   Clinical lab tests: Reviewed or ordered   Tests in the radiology section: reviewed or ordered  Discussion of test results with the patient-yes  Obtain previous medical records or obtain history from someone other than the patient: No  Obtain history from someone other than the patient: no  Review and address past medical records: yes  Discuss the patient with another provider: no  Independant visualization of image, tracing, or specimen: yes     Risk of Significant Complications, Morbidity, and/or Mortality:   Presenting problems: High   Diagnostic procedures: Moderate   Management options: Moderate     Transition of Care time spent:   Total time providing care and documentation: 40-60 minutes   Progress at discharge:   Stable on Discharge      Progress Note    Patient: Екатерина Partida MRN: [id number]  SSN: [id number]    YOB: 1965  Age: 48 y.o. Sex: female        Chief Complaint   Patient presents with   [facility] Follow Up         Subjective:     Problems addressed:    ICD-10-CM ICD-9-CM    1. Pyelonephritis N12 590.80    2. Type 2 diabetes mellitus with complication, without long-term current use of insulin ([facility]) E11.8 250.90 REFERRAL TO DIABETIC EDUCATION   3. Encounter for support and coordination of transition of care Z71.89 V65.49      Pyelonephritis: involving right kidney. Concern for an abscess formation. Clinically she remains stable. Blood and urine cultures positive for E coli. Follow up blood Cx neg. Seen by ID and urology. She was discharged home on a prolonged course of Ciprofloxacin with a Urology follow up and repeat CT scan in 6 weeks.     DM: uncontrolled with hyperglycemia. A1c 14.6. She was discharged with NPH 15 units BID and Metformin 500 mg BID.  She was counseled on diet and medications. She hasn't been checking her glucose at home because she doesn't have the supplies. Of note, patient has not bought any of her medications due to cost.     Current and past medical information:    Current Medications after this visit[de-identified]     Current Outpatient Medications   Medication Sig    insulin NPH (NOVOLIN N, HUMULIN N) 100 unit/mL injection 15 Units by SubCUTAneous route Before breakfast and dinner.  ciprofloxacin HCl (CIPRO) 500 mg tablet Take 1 Tab by mouth two (2) times a day for 28 days.  Insulin Syringes, Disposable, 1 mL syrg 100 Each by Does Not Apply route Before breakfast, lunch, and dinner.  glucose blood VI test strips (BLOOD GLUCOSE TEST) strip Test as intructed    Lancing Device misc 1 Device by Does Not Apply route Before breakfast, lunch, and dinner.  lancets (MICRO THIN LANCETS) 33 gauge misc Test blood glucose as instructed    metFORMIN (GLUCOPHAGE) 500 mg tablet Take 1 Tab by mouth two (2) times daily (with meals) for 30 days. No current facility-administered medications for this visit. Patient Active Problem List    Diagnosis Date Noted    Acute pyelonephritis 11/08/2018    Elevated alkaline phosphatase level 02/12/2016    DM (diabetes mellitus), type 2 ([facility] 01/12/2016       Past Medical History:   Diagnosis Date    DM [facility]diabetes mellitus) ([email or web address]        No Known Allergies    No past surgical history on file.     Social History     Socioeconomic History    Marital status:      Spouse name: Not on file    Number of children: Not on file    Years of education: Not on file    Highest education level: Not on file   Social Needs    Financial resource strain: Not on file    Food insecurity - worry: Not on file    Food insecurity - inability: Not on file    Transportation needs - medical: Not on file   [employer] needs - non-medical: Not on file   Occupational History    Not on file   Tobacco Use    Smoking status: Never Smoker   Substance and Sexual Activity    Alcohol use: No     Alcohol/week: 0.0 oz    Drug use: No    Sexual activity: Not on file   Other Topics Concern    Not on file   Social History Narrative    Not on file         Review of Systems   Constitutional: Negative. Respiratory: Negative. Cardiovascular: Negative. Gastrointestinal: Negative. Genitourinary: Negative. Skin: Negative. Objective:     Vitals:    11/14/18 1025   BP: 114/77   Pulse: 81   Resp: 16   Temp: 98.3 °F (36.8 °C)   TempSrc: Oral   SpO2: 99%   Weight: 123 lb (55.8 kg)   Height: 4' 10\" (1.473 m)        Physical Exam   Constitutional: She appears well-developed and well-nourished. No distress. Cardiovascular: Normal rate and regular rhythm. Exam reveals no gallop and no friction rub. No murmur heard. Pulmonary/Chest: Effort normal and breath sounds normal. She has no wheezes. She has no rales. Abdominal: Soft. Bowel sounds are normal. She exhibits no distension. There is no tenderness. Skin: Skin is warm and dry. No rash noted. Health Maintenance Due   Topic Date Due    LIPID PANEL Q1  1965    FOOT EXAM Q1  03/05/1977    MICROALBUMIN Q1  03/05/1977    EYE EXAM RETINAL OR DILATED Q1  03/05/1977    Pneumococcal 19-64 Medium Risk (1 of 1 - PPSV23) 03/05/1986    DTaP/Tdap/Td series (1 - Tdap) 03/05/1988    PAP AKA CERVICAL CYTOLOGY  03/05/1988    Shingrix Vaccine Age 50> (1 of 2) 03/05/2017    BREAST CANCER SCRN MAMMOGRAM  03/05/2017    FOBT Q 1 YEAR AGE 50-75  03/05/2017       Assessment and orders:       ICD-10-CM ICD-9-CM    1. Pyelonephritis N12 590.80    2. Type 2 diabetes mellitus with complication, without long-term current use of insulin ([facility]) E11.8 250.90 REFERRAL TO DIABETIC EDUCATION   3. Encounter for support and coordination of transition of care Z71.89 V65.49          Plan of care:  Discussed diagnoses in detail with patient.      Medication risks/benefits/side effects discussed with patient. She was encouraged to be compliant with all her medications and check her glucose levels at home. She is to have a glucose log and bring it back at next appointment. She was also encouraged to make appointment with Urology for repeat CT scan in 6 weeks. I made sure patient had the information from Urology. Coupons and instructions of where to buy her medicines were given. All of the patient's questions were addressed. The patient understands and agrees with our plan of care. The patient knows to call back if they are unsure of or forget any changes we discussed today or if the symptoms change. The patient received an After-Visit Summary which contains VS, orders, medication list and allergy list. This can be used as a \"mini-medical record\" should they have to seek medical care while out of town.       Future Appointments   Date Time Provider Ethan Rodriguez   12/12/2018  9:40 AM Elia Bowen MD [facility]       Signed By: Angelica Eubanks MD     November 14, 2018

## 2018-11-14 NOTE — PATIENT INSTRUCTIONS
Diabetes tipo 2: Instrucciones de cuidado - [ Type 2 Diabetes: Care Instructions ]  Instrucciones de cuidado    La diabetes tipo 2 es sandoval enfermedad que se desarrolla cuando los tejidos del organismo no pueden utilizar la insulina de [place]. Con el tiempo, el páncreas no puede producir suficiente insulina. La insulina es sandoval hormona que ayuda a las células del cuerpo a utilizar el azúcar (glucosa) para obtener energía. También ayuda al cuerpo a almacenar el azúcar adicional en las células de los músculos, la grasa y [place]. Sin la insulina, el azúcar no puede entrar en las células para hacer bush trabajo. En cambio, se queda en [place] [employer]. [place] puede causar [employer] de azúcar en la mansoor. Sandoval persona tiene diabetes cuando bush nivel de azúcar en la mansoor permanece demasiado alto, nadia demasiado [place]. Con el tiempo, la diabetes puede provocar enfermedades del corazón, los vasos sanguíneos, los nervios, los riñones y los ojos. Es posible que pueda controlar el azúcar en la mansoor al bajar de [place], comer sandoval dieta saludable y hacer ejercicio a diario. También podría tener que usar insulina u otros medicamentos para la diabetes. La atención de seguimiento es sandoval parte clave de bush tratamiento y seguridad. Asegúrese de hacer y acudir a todas las citas, y llame a bush médico si está teniendo problemas. También es sandoval buena idea saber los resultados de andres exámenes y mantener sandoval lista de los medicamentos que jenn. ¿Cómo puede cuidarse en el hogar? · Mantenga el azúcar en la mansoor en el nivel ideal (que usted fija con bush médico). ? Siga sandoval buena dieta que distribuya los carbohidratos a lo augustin del día. Los carbohidratos, que constituyen la principal zachariah de energía del organismo, afectan el azúcar en la mansoor en mayor medida que cualquier otro nutriente. Los carbohidratos están presentes en las frutas, las verduras, la [place] y el yogur.  También [employer], los cereales, las verduras jessica las bogdan y Sarbjit Schroeder (elote), y en los alimentos [place], jessica los caramelos y los pasteles. ? Trate de hacer 30 minutos de ejercicio la [address] o, de preferencia, todos los días de la [place]. Caminar es sandoval buena opción. Es posible que también quiera hacer otras actividades, jessica correr, nadar, [employer], o jugar al tenis o deportes de equipo. Si bush médico lo aprueba, gloria ejercicios de fortalecimiento muscular al menos 2 veces por semana. ? [employer] fueron recetados. Llame a bush médico si panchito estar teniendo un problema con bush medicamento. Recibirá más [employer] medicamentos específicos recetados por bush médico.  · Revise el azúcar en bush mansoor con tanta frecuencia jessica lo recomiende bush médico. Es importante seguir con atención cualquier síntoma que tenga, jessica bajo nivel de azúcar en la mansoor, y cualquier [place], la dieta o el uso de [place]. · Hable con bush médico antes de empezar a sagar [employer]. La aspirina puede ayudar a determinadas personas a reducir bush riesgo de tener un ataque cardíaco o un ataque cerebral. Adal sagar aspirina no es adecuado para todo el TRENGEREID, debido a que puede causar sangrado grave. · No fume. Si necesita ayuda para dejar de fumar, hable con bush médico sobre programas y medicamentos para dejar de fumar. Estos pueden aumentar andres probabilidades de dejar el hábito para siempre. · Mantenga el colesterol y la presión arterial a niveles normales. Lloyd Musty necesite sagar pamela o más medicamentos para alcanzar andres objetivos. Tómelos exactamente de acuerdo con las indicaciones. No deje de sagar ni cambie un medicamento sin hablar antes con bush médico.  ¿Cuándo debe pedir ayuda? Llame al 911 en cualquier momento que considere que necesita atención de [place]. Por ejemplo, llame si:    · Se desmayó (perdió el conocimiento), o de repente se siente muy somnoliento (con sueño) o confuso.  (Belenda Siemens un nivel muy bajo de azúcar en la mansoor).    Llame a bush médico ahora mismo o busque atención médica inmediata si:    · Bush nivel de azúcar en la mansoor es de 300 mg/dL o es más alto que el nivel que bush médico ha establecido para usted.     · Presenta síntomas de un bajo nivel de azúcar en la mansoor, tales jessica:  ? Sudoración. ? Sentirse nervioso, tembloroso y débil. ? Hambre extrema y náuseas leves. ? Anabelle Miss y dolor de [place]. ? Velma Herrera. ? Confusión.    Preste especial atención a los cambios en bush da y asegúrese de comunicarse con bush médico si:    · A menudo tiene problemas para controlar el azúcar en la mansoor.     · Tiene síntomas de problemas a augustin plazo causados por la diabetes, tales jessica:  ? Cambios nuevos en la visión. ? MAYDAMocarri [employer], entumecimiento u hormigueo en las gabi o los pies. ? Problemas en la piel. ¿Dónde puede encontrar más información en inglés? Mayra Silence a [email or web address]-mague.info/. Escriba C553 en la búsqueda para aprender más acerca de \"Diabetes tipo 2: Instrucciones de cuidado - [ Type 2 Diabetes: Care Instructions ]. \"  Revisado: 7 jarodmbre, 2017  Versión del contenido: 11.8  © 1159-4062 Healthwise, Incorporated. Las instrucciones de cuidado fueron adaptadas bajo licencia por Good Help Connections (which disclaims liability or warranty for this information). Si usted tiene [place] afección médica o sobre estas instrucciones, siempre pregunte a bush profesional de da. Healthwise, Incorporated niega toda garantía o responsabilidad por bush uso de esta información. Aprenda sobre la metformina para la diabetes tipo 2 - [ Learning About Metformin for Type 2 Diabetes ]  Introducción    La metformina (jessica Glucophage) es un medicamento utilizado para tratar la diabetes tipo 2. Ayuda a mantener los niveles de azúcar en la mansoor dentro de los límites deseados.   Usted puede thuy tratado de alimentarse en forma saludable, de adelgazar y de hacer más ejercicio para mantener el nivel de azúcar en bush mansoor dentro de andres límites ideales. Si esas cosas no ayudan, usted puede sagar un medicamento que se llama metformina. Ayuda a bush organismo a usar la insulina. [place] puede ayudarlo a controlar el azúcar en la mansoor. Puede tomarla terry o con otros medicamentos. Cuando se jenn terry, la metformina no debería causar niveles bajos de azúcar en la mansoor ni aumento de [place]. Ejemplo  · Metformina (Glucophage)  Posibles efectos secundarios  Los efectos secundarios comunes incluyen:  · Náuseas a corto plazo. · Pérdida del apetito. · Diarrea. · Aumento de gases en el abdomen. · Sabor metálico.  Podría tener efectos secundarios o reacciones que no aparecen en esta lista. Revise la información que viene con bush medicamento. Qué debe saber acerca de sagar gerber medicamento  · La metformina no suele causar niveles bajos de azúcar en la mansoor. Adal puede tener un nivel bajo de azúcar en la mansoor cuando jenn metformina y hace ejercicio intenso, shira alcohol o no ingiere suficiente alimento. · En ocasiones, la metformina se combina con otros medicamentos para la diabetes. Algunos de estos pueden causar niveles bajos de azúcar en la mansoor. · Si necesita hacerse sandoval prueba en la que se [place] un tinte o tiene que operarse, asegúrese de decirles a todos andres médicos que usted jenn metformina. Es posible que tenga que dejar de tomarla antes y después de [address]. · Con el tiempo, los niveles sanguíneos de vitamina B12 pueden disminuir en algunas personas que reji metformina. Bush organismo necesita esta vitamina B para producir células sanguíneas. También mantiene shilpa el sistema nervioso. Si ha estado tomando metformina por más de [employer], pregúntele a bush médico si necesita hacerse un análisis de [place]oor de vitamina B12 para medir la cantidad de vitamina B12 en la mansoor. · Sea julián con los medicamentos.  [place] andres medicamentos exactamente [employer]	[place] fueron recetados. Llame a bush médico si panchito estar teniendo un problema con andres medicamentos. · Consulte con bush médico o con el farmacéutico antes de lucille cualquier otro medicamento, incluidos los de [employer] First [employer]. Asegúrese de que bush médico sepa todos los medicamentos, vitaminas, productos herbarios y suplementos que usted está tomando. [place] juntos algunos medicamentos puede [employer]. ¿Dónde puede encontrar más información en [facility]? Galina Stade a http://radha-mague.info/. Escriba N663 en la búsqueda para aprender más acerca de \"Aprenda sobre la metformina para la diabetes tipo 2 - [ Learning About Metformin for Type 2 Diabetes ]. \"  Revisado: 7 jarodmbjeffery, 2017  Versión del contenido: 11.8  © 4861-4374 Healthwise, Incorporated. Las instrucciones de cuidado fueron adaptadas bajo licencia por iWeebo Connections (which disclaims liability or warranty for this information). Si usted tiene [place] afección médica o sobre estas instrucciones, siempre pregunte a bush profesional de da. Healthwise, Incorporated niega toda garantía o responsabilidad por bush uso de esta información. Infección renal: Instrucciones de cuidado - [ Kidney Infection: Care Instructions ]  Instrucciones de cuidado    Sandoval infección renal (pielonefritis) es un tipo de infección urinaria (UTI, por andres siglas en inglés). La mayoría de las UTI son infecciones de la vejiga. Las infecciones renales tienden a enfermar más a las personas que las infecciones de la vejiga. Gela Villarreal son más graves porque pueden causar daños duraderos si no se tratan con rapidez. La atención de seguimiento es sandoval parte clave de bush tratamiento y seguridad. Asegúrese de hacer y acudir a todas las citas, y llame a bush médico si está teniendo problemas. También es sandoval buena idea saber los resultados de andres exámenes y mantener sandoval lista de los medicamentos que jenn. ¿Cómo puede cuidarse en el [facility]?   [facility] antibióticos según las indicaciones. No deje de tomarlos por el hecho de sentirse mejor. Debe sagar todos los antibióticos hasta terminarlos. · Rosa abundante agua, lo suficiente para que bush orina sea de color amarillo ruddy o transparente jessica el agua. [place] puede ayudar a eliminar las bacterias que causan la infección. Si tiene [employer], del corazón o del hígado y tiene que Gonzalo's líquidos, hable con bush médico antes de aumentar bush consumo. · Orine con frecuencia. Trate de vaciar la vejiga cada vez que orine. · Para aliviar el dolor, tome sandoval ducha caliente o colóquese sandoval almohadilla térmica (a baja temperatura) sobre la parte baja del abdomen. Nunca se duerma mientras [place] sandoval almohadilla térmica. Póngase un paño cifuentes entre la almohadilla térmica y la piel. Cómo ayudar a prevenir las infecciones renales  · Rosa abundante agua todos los GRASSE. [place] le ayuda a orinar con frecuencia, lo que elimina las bacterias del organismo. Si tiene [employer], del corazón o del hígado y tiene que Gonzalo's líquidos, hable con bush médico antes de aumentar bush consumo. · Orine en cuanto sienta la necesidad de hacerlo. No retenga la [employer]. Orine antes de irse a dormir. · Si usted presenta síntomas de infección en la vejiga, jessica ardor al orinar u orinar con Jock Gails a bush médico para que trate el problema antes de que empeore. Si no trata sandoval infección de vejiga de inmediato, puede extenderse al riñón. · Los hombres deben mantener limpia la punta del pene. Si es stefanie, tenga en cuenta estas ideas:  · Orine inmediatamente después de thuy tenido [employer]. · Cámbiese las toallas sanitarias con frecuencia. Evite el uso de lavados vaginales, los aerosoles de higiene femenina y otros productos para la higiene femenina que contengan desodorantes. · Después de ir al baño, límpiese de adelante hacia atrás. ¿Cuándo debe pedir ayuda?   Llame a bush médico ahora mismo o busque atención médica inmediata si:    · Tiene dolor en aumento en la espalda storm debajo de las [place] se llama dolor en el flanco.     · Tiene fiebre nueva o más chriss y escalofríos.     · Tiene vómito o náuseas.    Preste especial atención a los cambios en bush da y asegúrese de comunicarse con bush médico si:    · Los síntomas, jessica el ardor al orinar, empeoran o mejoran Blondie Colemanau a aparecer.     · No está mejorando después de 2 días. ¿Dónde puede encontrar más información en inglés? Onur Hu a http://radha-mague.info/. Heidi Arnt Q285 en la búsqueda para aprender más acerca de \"Infección renal: Instrucciones de cuidado - [ Kidney Infection: Care Instructions ]. \"  Revisado: 15 marzo, 2018  Versión del contenido: 11.8  © 8237-3592 Healthwise, Incorporated. Las instrucciones de cuidado fueron adaptadas bajo licencia por Good Help Connections (which disclaims liability or warranty for this information). Si usted tiene [place] afección médica o sobre estas instrucciones, siempre pregunte a bush profesional de da. Healthwise, Incorporated niega toda garantía o responsabilidad por bush uso de esta información.

## 2018-11-15 NOTE — PROGRESS NOTES
[address] Medicine Residency Attending Addendum:  I saw and evaluated the patient on the day of the encounter with [unfilled], performing the key elements of the service. I discussed the findings, assessment and plan with the resident and agree with the resident's findings and plan as documented in the resident's note.       Scarlett Gonzales MD, CAQSM, RMSK

## 2018-11-18 LAB
BACTERIA SPEC CULT: NORMAL
SERVICE CMNT-IMP: NORMAL

## 2018-11-29 ENCOUNTER — TELEPHONE (OUTPATIENT)
Dept: FAMILY MEDICINE CLINIC | Age: 53
End: 2018-11-29

## 2018-11-29 NOTE — TELEPHONE ENCOUNTER
Treva Paulson (nurse) with [address] calling and states patient is there and asking if Dr. Shanta Santiago will write return to work note based on last office visit here at clinic.      Nurse Raoul SEGUNDO assisted with call

## 2018-12-12 ENCOUNTER — OFFICE VISIT (OUTPATIENT)
Dept: FAMILY MEDICINE CLINIC | Age: 53
End: 2018-12-12

## 2018-12-12 VITALS
RESPIRATION RATE: 16 BRPM | WEIGHT: 125 LBS | BODY MASS INDEX: 26.24 KG/M2 | OXYGEN SATURATION: 98 % | DIASTOLIC BLOOD PRESSURE: 83 MMHG | SYSTOLIC BLOOD PRESSURE: 144 MMHG | HEART RATE: 80 BPM | HEIGHT: 58 IN | TEMPERATURE: 98.3 F

## 2018-12-12 DIAGNOSIS — N12 PYELONEPHRITIS: Primary | ICD-10-CM

## 2018-12-12 LAB
BILIRUB UR QL STRIP: NEGATIVE
GLUCOSE UR-MCNC: NEGATIVE MG/DL
KETONES P FAST UR STRIP-MCNC: NEGATIVE MG/DL
PH UR STRIP: 5.5 [PH] (ref 4.6–8)
PROT UR QL STRIP: NEGATIVE
SP GR UR STRIP: 1.01 (ref 1–1.03)
UA UROBILINOGEN AMB POC: NORMAL (ref 0.2–1)
URINALYSIS CLARITY POC: CLEAR
URINALYSIS COLOR POC: YELLOW
URINE BLOOD POC: NEGATIVE
URINE LEUKOCYTES POC: NORMAL
URINE NITRITES POC: NEGATIVE

## 2018-12-12 RX ORDER — LANCING DEVICE
1 EACH MISCELLANEOUS
Qty: 1 EACH | Refills: 1 | Status: SHIPPED | OUTPATIENT
Start: 2018-12-12

## 2018-12-12 RX ORDER — LANCETS 33 GAUGE
EACH MISCELLANEOUS
Qty: 100 LANCET | Refills: 2 | Status: SHIPPED | OUTPATIENT
Start: 2018-12-12

## 2018-12-12 NOTE — PROGRESS NOTES
Chief Complaint   Patient presents with    Follow-up     1. Have you been to the ER, urgent care clinic since your last visit? Hospitalized since your last visit? No    2. Have you seen or consulted any other health care providers outside of the [address] [place] [address] since your last visit? Include any pap smears or colon screening.  No      Refill novolin

## 2018-12-12 NOTE — PROGRESS NOTES
48year old female here for medication refill -- needs NPH and recently started on metformin. Hospitalized one month ago for pyelonephritis -- ? Developing abscess, bacteriremia -- on cipro for one month, repeat blood cultures were negative    Urology consult -- appt 6 weeks after first CT scan    I reviewed with the resident the medical history and the resident's findings on the physical examination. I discussed with the resident the patient's diagnosis and concur with the plan.

## 2018-12-12 NOTE — PROGRESS NOTES
Subjective    Chief complaint: follow up     Gerardo William is an 48 y.o. female here for follow up from last visit. -Right-sided pyelonephritis with possible early developing abscess complicated by Escherichia coli bacteremia.  Repeat blood cultures NGSF. Currently on cipro 500mg BID. Reports improvement of dysuria and flank pain. Has not made an appointment for 6 week follow up for CT with urology. Diabetes: Poorly controlled. Recent A1C 14.6. Currently on metformin 500mg BID, NPH 15 units BID. Requesting refill of NPH and lancets. Reports compliance. Has not seen diabetes educator that she was referred to at prevoius visit. Hubs1  #: [id number]    Allergies - reviewed:   No Known Allergies      Medications - reviewed:   Current Outpatient Medications   Medication Sig    Lancing Device misc 1 Device by Does Not Apply route Before breakfast, lunch, and dinner.  lancets (MICRO THIN LANCETS) 33 gauge misc Test blood glucose as instructed    Insulin Syringes, Disposable, 1 mL syrg 100 Each by Does Not Apply route Before breakfast, lunch, and dinner.  insulin NPH (NOVOLIN N, HUMULIN N) 100 unit/mL injection 15 Units by SubCUTAneous route Before breakfast and dinner.  metFORMIN (GLUCOPHAGE) 500 mg tablet Take 1 Tab by mouth two (2) times daily (with meals) for 30 days.  glucose blood VI test strips (BLOOD GLUCOSE TEST) strip Test as intructed     No current facility-administered medications for this visit.           Past Medical History - reviewed:  Past Medical History:   Diagnosis Date    DM (diabetes mellitus) ([facility] [address]          Immunizations - reviewed:   Immunization History   Administered Date(s) Administered    Influenza Vaccine (Quad) PF 11/09/2018         ROS  Review of Systems :   Constitutional: negative for fatigue and malaise  Respiratory: negative for shortness of breath  Cardiovascular: negative for chest pain  Gastrointestinal: negative for abdominal pain  Neurological: negative for dizziness/headache  : negative for dysuria or flank pain   Endocrine: negative for parasthesias    Physical Exam  Visit Vitals  /83   Pulse 80   Temp 98.3 °F (36.8 °C) (Oral)   Resp 16   Ht 4' 10\" (1.473 m)   Wt 125 lb (56.7 kg)   SpO2 98%   BMI 26.13 kg/m²       General appearance - Alert, NAD. Head: Atraumatic. Normocephalic. Respiratory - LCTAB. No wheeze/rale/rhonchi  Heart - Normal rate, regular rhythm. No m/r/r  Abdomen - Soft, non tender. Non distended. No CVA tenderness   Neurological - No focal deficits. Speech normal.   Musculoskeletal - Normal ROM, Gait normal.    Extremities - No LE edema. Distal pulses intact    Assessment/Plan  1. Pyelonephritis: Clinically improved. Continue cipro. [facility] urology who stated that patient was on their list of people to reach out to and they will contact her to schedule an appointment for next week for repeat CT.   - AMB POC URINALYSIS DIP STICK AUTO W/ MICRO    2. Insulin dependent [facility] Medication refilled as below. Stressed importance of medication compliance and checking BG daily. Also provided her the number for diabetes educator.   - Lancing Device misc; 1 Device by Does Not Apply route Before breakfast, lunch, and dinner. Dispense: 1 Each; Refill: 1  - lancets (MICRO THIN LANCETS) 33 gauge misc; Test blood glucose as instructed  Dispense: 100 Lancet; Refill: 2  - Insulin Syringes, Disposable, 1 mL syrg; 100 Each by Does Not Apply route Before breakfast, lunch, and dinner. Dispense: 100 Syringe; Refill: 2  - insulin NPH (NOVOLIN N, HUMULIN N) 100 unit/mL injection; 15 Units by SubCUTAneous route Before breakfast and dinner. Dispense: 2 Vial; Refill: 5    Follow-up Disposition:  Return in about 2 months (around 2/12/2019), or if symptoms worsen or fail to improve, for DM follow up . I discussed the aforementioned diagnoses with the patient as well as the plan of care.      Inessa Kristian Schuler MD  Family Medicine Resident  PGY 3

## 2018-12-12 NOTE — PATIENT INSTRUCTIONS
[facility] Diabetes Educator  [place] [address]  Phone: [phone] [address] pautas alimentarias para diabetes - [ Learning About Diabetes Food Guidelines ]  Instrucciones de cuidado    La planificación de las comidas es importante para el manejo de la diabetes. Ayuda a mantener el azúcar en la mansoor en el nivel ideal (que usted fija con bush médico). Usted no tiene que comer alimentos especiales. Puede comer lo mismo que bush polina, incluso dulces de vez en cuando. Adal debe prestar atención a la cantidad y la frecuencia con que come ciertos alimentos. Tramaine vez desee colaborar con un dietista o educador de diabetes certificado (CDE, por andres siglas en inglés) para que le ayuden a planificar las comidas y los refrigerios. Un dietista o CDE también puede ayudarle a bajar de peso si basia es pamela de andres objetivos. ¿Qué debería saber acerca de ingerir carbohidratos? Manejar la cantidad de carbohidratos que ingiere es michelle parte importante de la alimentación saludable cuando tiene diabetes. Los carbohidratos se encuentran en muchos alimentos. · Sepa qué alimentos contienen carbohidratos. Y aprenda qué cantidad de carbohidratos contienen los diferentes alimentos. ? El pan, los cereales, la pasta y el arroz tienen aproximadamente 15 gramos de carbohidratos por porción. Michelle porción equivale a 1 rebanada de pan (1 onza o 28 g), ½ taza de cereal cocido o 1/3 de taza de pasta o arroz cocidos. ? Las frutas tienen 15 gramos de carbohidratos por porción. [place] porción es 1 fruta fresca pequeña, jessica michelle [place] o michelle naranja; ½ banana (plátano); ½ taza de fruta cocida o enlatada; ½ taza de jugo de fruta; 1 taza de melón o frambuesas; o 2 cucharadas de frutas secas. ? Cordelia Kudo y el yogur sin azúcar agregado tienen 15 gramos de carbohidratos por porción. [place] porción es 1 taza de [place] o 2/3 taza de yogur sin azúcar agregado. ? Las verduras con almidón tienen 15 gramos de carbohidratos por porción. [place] porción es ½ taza de puré de papa o camote (batata, boniato); 1 taza de calabacín; ½ papa horneada pequeña; ½ taza de frijoles cocidos; o ½ taza de maíz (elote) o arvejas (chícharos) cocidos. · Aprenda cuántos carbohidratos debe consumir cada día y en cada comida. Un dietista o CDE le puede enseñar cómo llevar la cuenta de los carbohidratos que consume. A esto se le llama recuento de carbohidratos. · Si no está seguro de cómo [place] gramos de carbohidratos, utilice el [place] para planificar las comidas. Es sandoval Fifi Ivans y rápida de asegurarse de que consuma comidas equilibradas. También le ayuda a distribuir los carbohidratos nadia el día. ? Divida el plato por tipo de alimento. Llene medio plato con verduras sin almidón, ponga carne u otras proteínas en sandoval cuarta parte del plato y granos o verduras con almidón en el último cuarto del plato. A esto puede agregarle un pequeño pedazo de fruta y 3 taza de [place] o yogur, según la cantidad de carbohidratos que deba consumir en sandoval comida. · Trate de comer aproximadamente la misma cantidad de carbohidratos en cada comida. No \"reserve\" bush cantidad diaria de carbohidratos para consumirlos en sandoval terry comida. · Las proteínas contienen muy pocos o nada de carbohidratos por porción. Los ejemplos de proteínas incluyen carne de res, [place], [place], [place], [place], tofu, [place]-barre, requesón (\"cottage cheese\") y la mantequilla de cacahuate [place]). Sandoval porción de carne son 3 onzas (85 g), lo cual es aproximadamente del tamaño de sandoval baraja de naipes. Los ejemplos de porciones de sustitutos de la carne (equivalente a 1 onza o 28 g de carne) son 1/4 de taza de requesón, 1 huevo, 1 cucharada de [place] de cacahuate y ½ taza de tofu. ¿Cómo puede comer fuera y aún así comer de modo saludable? · Aprenda a calcular los tamaños de las porciones de alimentos que contienen carbohidratos.  Si mide la comida en casa, será más fácil calcular la cantidad en [place] porción de comida de restaurante. · Si el platillo que pide contiene demasiados carbohidratos (jessica bogdan, maíz o frijoles al horno), pida un alimento bajo en carbohidratos en devries lugar. Pida sandoval ensalada o verduras. · Si [place] insulina, revise devries azúcar en la mansoor antes y después de comer fuera para ayudarle a planear cuánto comer en el futuro. · Si usted come más carbohidratos de lo planeado en sandoval comida, dé un paseo o gloria otro tipo de ejercicio. [place] ayudará a reducir el azúcar en la mansoor. ¿Qué más debería saber? · Limite las grasas saturadas, jessica la grasa de la carne y productos lácteos. Esta es sandoval opción saludable, porque las personas que tienen diabetes tienen un mayor riesgo de enfermedades del corazón. Así que elija boothe magros de carne y productos lácteos descremados o semidescremados. Utilice aceite de ortiz o de canola en lugar de mantequilla o manteca al cocinar. · No se salte comidas. Devries nivel de azúcar en la mansoor puede bajar demasiado si usted se salta comidas y jenn insulina o ciertos medicamentos para la diabetes. · Consulte con devries médico antes de beber alcohol. El alcohol puede hacer que devries azúcar en la mansoor baje demasiado. El alcohol también puede causar sandoval reacción adversa si usted jenn ciertos medicamentos para la diabetes. La atención de seguimiento es sandoval parte clave de devries tratamiento y seguridad. Asegúrese de hacer y acudir a todas las citas, y llame a devries médico si está teniendo problemas. También es sandoval buena idea saber los resultados de andres exámenes y mantener sandoval lista de los medicamentos que jenn. ¿Dónde puede encontrar más información en inglés? Kevin Dk a [email or web address] Sagar Byrne [id number] en la búsqueda para aprender más acerca de \"Aprenda acerca de las pautas alimentarias para diabetes - [ Learning About Diabetes Food Guidelines ]. \"  Revisado: 7 jarodmbjeffery, 2017  Versión del contenido: 11.8  © 9454-8445 [employer]wise, [employer].  Dionisio Trinidad instrucciones de cuidado fueron adaptadas bajo licencia por [employer] (which disclaims liability or warranty for this information). Si usted tiene [place] afección médica o sobre estas instrucciones, siempre pregunte a bush profesional de da. [facility] niega toda garantía o responsabilidad por bush uso de esta información.